# Patient Record
Sex: FEMALE | Race: OTHER | Employment: OTHER | URBAN - METROPOLITAN AREA
[De-identification: names, ages, dates, MRNs, and addresses within clinical notes are randomized per-mention and may not be internally consistent; named-entity substitution may affect disease eponyms.]

---

## 2017-02-07 ENCOUNTER — TELEPHONE (OUTPATIENT)
Dept: INTERNAL MEDICINE CLINIC | Facility: CLINIC | Age: 67
End: 2017-02-07

## 2017-02-07 NOTE — TELEPHONE ENCOUNTER
Per Dr. Enio Delgado instruction to call patient and ask patient to come in for f/u blood pressure prior to dental procedure.     LMTCB    RYANN,  When patient call back please assist to make a f/u appointment for BP

## 2017-02-13 ENCOUNTER — OFFICE VISIT (OUTPATIENT)
Dept: INTERNAL MEDICINE CLINIC | Facility: CLINIC | Age: 67
End: 2017-02-13

## 2017-02-13 VITALS
WEIGHT: 181 LBS | BODY MASS INDEX: 33 KG/M2 | SYSTOLIC BLOOD PRESSURE: 158 MMHG | RESPIRATION RATE: 22 BRPM | DIASTOLIC BLOOD PRESSURE: 96 MMHG | HEART RATE: 99 BPM

## 2017-02-13 DIAGNOSIS — I10 ESSENTIAL HYPERTENSION: Primary | ICD-10-CM

## 2017-02-13 PROCEDURE — 99214 OFFICE O/P EST MOD 30 MIN: CPT | Performed by: INTERNAL MEDICINE

## 2017-02-13 PROCEDURE — G0463 HOSPITAL OUTPT CLINIC VISIT: HCPCS | Performed by: INTERNAL MEDICINE

## 2017-02-13 RX ORDER — CARVEDILOL 3.12 MG/1
3.12 TABLET ORAL 2 TIMES DAILY WITH MEALS
Qty: 180 TABLET | Refills: 0 | Status: SHIPPED | OUTPATIENT
Start: 2017-02-13 | End: 2017-05-12

## 2017-02-14 NOTE — PROGRESS NOTES
HPI:    Patient ID: Rafiq Bhatia is a 73year old female presents for follow-up of hypertension.     HPI  Patient reports that she has been feeling well, she has seen a dentist requires a root canal, but blood pressure stays elevated to the dentist will Cardiovascular: Normal rate, regular rhythm and normal heart sounds. No murmur heard. Edema not present. Carotid bruit not present. Pulmonary/Chest: Effort normal and breath sounds normal. No respiratory distress. She has no wheezes.  She has no rale

## 2017-03-15 ENCOUNTER — OFFICE VISIT (OUTPATIENT)
Dept: INTERNAL MEDICINE CLINIC | Facility: CLINIC | Age: 67
End: 2017-03-15

## 2017-03-15 VITALS — SYSTOLIC BLOOD PRESSURE: 130 MMHG | DIASTOLIC BLOOD PRESSURE: 75 MMHG | RESPIRATION RATE: 23 BRPM | HEART RATE: 93 BPM

## 2017-03-15 DIAGNOSIS — I10 ESSENTIAL HYPERTENSION: Primary | ICD-10-CM

## 2017-03-15 DIAGNOSIS — E78.00 PURE HYPERCHOLESTEROLEMIA: ICD-10-CM

## 2017-03-15 PROCEDURE — G0463 HOSPITAL OUTPT CLINIC VISIT: HCPCS | Performed by: INTERNAL MEDICINE

## 2017-03-15 PROCEDURE — 99213 OFFICE O/P EST LOW 20 MIN: CPT | Performed by: INTERNAL MEDICINE

## 2017-03-15 RX ORDER — ERGOCALCIFEROL 1.25 MG/1
CAPSULE ORAL
Refills: 1 | COMMUNITY
Start: 2017-03-06 | End: 2017-06-07

## 2017-03-16 NOTE — PROGRESS NOTES
HPI:    Patient ID: Lazarus Sprinkle is a 73year old female presents for follow-up on hypertension.     HPI  Patient was seen in the office 1 month ago for evaluation of elevated blood pressure, she was started to carvedilol 3.125 mg twice a day and continu No murmur heard. Edema not present. Carotid bruit not present. Pulmonary/Chest: Effort normal and breath sounds normal. No respiratory distress. She has no wheezes. Lymphadenopathy:     She has no cervical adenopathy.    Neurological: She is alert and

## 2017-03-20 RX ORDER — DULOXETIN HYDROCHLORIDE 60 MG/1
CAPSULE, DELAYED RELEASE ORAL
Qty: 90 CAPSULE | Refills: 1 | Status: SHIPPED | OUTPATIENT
Start: 2017-03-20 | End: 2017-12-08

## 2017-05-04 RX ORDER — ENALAPRIL MALEATE 10 MG/1
TABLET ORAL
Qty: 180 TABLET | Refills: 3 | Status: SHIPPED | OUTPATIENT
Start: 2017-05-04 | End: 2018-05-03

## 2017-05-12 RX ORDER — CARVEDILOL 3.12 MG/1
TABLET ORAL
Qty: 180 TABLET | Refills: 1 | Status: SHIPPED | OUTPATIENT
Start: 2017-05-12 | End: 2017-12-16

## 2017-05-23 ENCOUNTER — OFFICE VISIT (OUTPATIENT)
Dept: INTERNAL MEDICINE CLINIC | Facility: CLINIC | Age: 67
End: 2017-05-23

## 2017-05-23 ENCOUNTER — APPOINTMENT (OUTPATIENT)
Dept: LAB | Age: 67
End: 2017-05-23
Attending: INTERNAL MEDICINE
Payer: MEDICARE

## 2017-05-23 ENCOUNTER — HOSPITAL ENCOUNTER (OUTPATIENT)
Dept: GENERAL RADIOLOGY | Age: 67
Discharge: HOME | End: 2017-05-23
Attending: INTERNAL MEDICINE | Admitting: INTERNAL MEDICINE
Payer: MEDICARE

## 2017-05-23 VITALS
DIASTOLIC BLOOD PRESSURE: 87 MMHG | WEIGHT: 181 LBS | BODY MASS INDEX: 33 KG/M2 | RESPIRATION RATE: 24 BRPM | SYSTOLIC BLOOD PRESSURE: 145 MMHG | HEART RATE: 98 BPM

## 2017-05-23 DIAGNOSIS — R05.9 COUGH: ICD-10-CM

## 2017-05-23 DIAGNOSIS — R05.9 COUGH: Primary | ICD-10-CM

## 2017-05-23 DIAGNOSIS — E55.9 VITAMIN D DEFICIENCY: ICD-10-CM

## 2017-05-23 DIAGNOSIS — J20.9 BRONCHITIS WITH BRONCHOSPASM: ICD-10-CM

## 2017-05-23 DIAGNOSIS — K21.9 GASTROESOPHAGEAL REFLUX DISEASE, ESOPHAGITIS PRESENCE NOT SPECIFIED: ICD-10-CM

## 2017-05-23 DIAGNOSIS — I10 ESSENTIAL HYPERTENSION: ICD-10-CM

## 2017-05-23 DIAGNOSIS — R07.9 CHEST PAIN AT REST: ICD-10-CM

## 2017-05-23 PROCEDURE — 99214 OFFICE O/P EST MOD 30 MIN: CPT | Performed by: INTERNAL MEDICINE

## 2017-05-23 PROCEDURE — 93005 ELECTROCARDIOGRAM TRACING: CPT

## 2017-05-23 PROCEDURE — G0463 HOSPITAL OUTPT CLINIC VISIT: HCPCS | Performed by: INTERNAL MEDICINE

## 2017-05-23 PROCEDURE — 93010 ELECTROCARDIOGRAM REPORT: CPT | Performed by: INTERNAL MEDICINE

## 2017-05-23 PROCEDURE — 94640 AIRWAY INHALATION TREATMENT: CPT | Performed by: INTERNAL MEDICINE

## 2017-05-23 PROCEDURE — 71020 XR CHEST PA + LAT CHEST (CPT=71020): CPT | Performed by: INTERNAL MEDICINE

## 2017-05-23 RX ORDER — CODEINE PHOSPHATE AND GUAIFENESIN 10; 100 MG/5ML; MG/5ML
5 SOLUTION ORAL EVERY 6 HOURS PRN
Qty: 180 ML | Refills: 0 | Status: SHIPPED | OUTPATIENT
Start: 2017-05-23 | End: 2017-08-08

## 2017-05-23 RX ORDER — AZITHROMYCIN 250 MG/1
TABLET, FILM COATED ORAL
Qty: 6 TABLET | Refills: 0 | Status: SHIPPED | OUTPATIENT
Start: 2017-05-23 | End: 2017-08-08

## 2017-05-23 RX ORDER — ALBUTEROL SULFATE 90 UG/1
2 AEROSOL, METERED RESPIRATORY (INHALATION) EVERY 4 HOURS PRN
Qty: 1 INHALER | Refills: 1 | Status: SHIPPED | OUTPATIENT
Start: 2017-05-23 | End: 2017-08-08

## 2017-05-23 RX ORDER — OMEPRAZOLE 40 MG/1
40 CAPSULE, DELAYED RELEASE ORAL DAILY
Qty: 90 CAPSULE | Refills: 0 | Status: SHIPPED | OUTPATIENT
Start: 2017-05-23 | End: 2017-08-28

## 2017-05-23 RX ORDER — LEVALBUTEROL INHALATION SOLUTION 0.63 MG/3ML
0.63 SOLUTION RESPIRATORY (INHALATION) ONCE
Status: COMPLETED | OUTPATIENT
Start: 2017-05-23 | End: 2017-05-23

## 2017-05-23 RX ADMIN — LEVALBUTEROL INHALATION SOLUTION 0.63 MG: 0.63 SOLUTION RESPIRATORY (INHALATION) at 15:04:00

## 2017-05-23 NOTE — PROGRESS NOTES
HPI:    Patient ID: Boogie Simpson is a 73year old female. Presents for evaluation of the cough.     HPI  Patient states that she is undergoing dental work, and recently in a dental office blood pressure was elevated again and the dentist canceled proced Sulfate HFA (VENTOLIN HFA) 108 (90 Base) MCG/ACT Inhalation Aero Soln Inhale 2 puffs into the lungs every 4 (four) hours as needed for Wheezing. Disp: 1 Inhaler Rfl: 1   CARVEDILOL 3.125 MG Oral Tab TAKE 1 TABLET BY MOUTH 2 TIMES DAILY WITH MEALS.  Disp: 18 will not change medication for now, will follow up in 2 weeks if blood pressure stays elevated we will adjust carvedilol  Vitamin d deficiency check vitamin D level, treat if necessary  GERD advised to eliminate peppermint, will treat with omeprazole 40 mg

## 2017-05-25 ENCOUNTER — TELEPHONE (OUTPATIENT)
Dept: INTERNAL MEDICINE CLINIC | Facility: CLINIC | Age: 67
End: 2017-05-25

## 2017-05-26 NOTE — TELEPHONE ENCOUNTER
Advised patient of Dr. Tom Pantoja notes. Patient verbalized understanding.     Notes Recorded by Vj Pelaez MD on 5/25/2017 at 8:09 PM  See below, see also chest x-ray results  Notes Recorded by Vj Pelaez MD on 5/25/2017 at 8:09 PM  Call patient EKG

## 2017-06-06 ENCOUNTER — HOSPITAL ENCOUNTER (OUTPATIENT)
Dept: CV DIAGNOSTICS | Facility: HOSPITAL | Age: 67
Discharge: HOME | End: 2017-06-06
Attending: INTERNAL MEDICINE
Payer: MEDICARE

## 2017-06-06 ENCOUNTER — HOSPITAL ENCOUNTER (OUTPATIENT)
Dept: NUCLEAR MEDICINE | Facility: HOSPITAL | Age: 67
Discharge: HOME | End: 2017-06-06
Attending: INTERNAL MEDICINE
Payer: MEDICARE

## 2017-06-06 DIAGNOSIS — R07.9 CHEST PAIN AT REST: ICD-10-CM

## 2017-06-06 PROCEDURE — 93016 CV STRESS TEST SUPVJ ONLY: CPT | Performed by: INTERNAL MEDICINE

## 2017-06-06 PROCEDURE — 93018 CV STRESS TEST I&R ONLY: CPT | Performed by: INTERNAL MEDICINE

## 2017-06-06 RX ORDER — 0.9 % SODIUM CHLORIDE 0.9 %
VIAL (ML) INJECTION
Status: COMPLETED
Start: 2017-06-06 | End: 2017-06-06

## 2017-06-06 RX ADMIN — 0.9 % SODIUM CHLORIDE 10 ML: 0.9 % VIAL (ML) INJECTION at 11:45:00

## 2017-06-07 ENCOUNTER — TELEPHONE (OUTPATIENT)
Dept: INTERNAL MEDICINE CLINIC | Facility: CLINIC | Age: 67
End: 2017-06-07

## 2017-06-07 RX ORDER — ERGOCALCIFEROL 1.25 MG/1
50000 CAPSULE ORAL WEEKLY
Qty: 12 CAPSULE | Refills: 1 | Status: SHIPPED | OUTPATIENT
Start: 2017-06-07 | End: 2017-11-24

## 2017-06-08 ENCOUNTER — TELEPHONE (OUTPATIENT)
Dept: INTERNAL MEDICINE CLINIC | Facility: CLINIC | Age: 67
End: 2017-06-08

## 2017-06-12 ENCOUNTER — TELEPHONE (OUTPATIENT)
Dept: INTERNAL MEDICINE CLINIC | Facility: CLINIC | Age: 67
End: 2017-06-12

## 2017-06-12 NOTE — TELEPHONE ENCOUNTER
----- Message from Lavinia Sepulveda MD sent at 6/7/2017  7:45 PM CDT -----  Normal normal call patient that vitamin D level improved, still needs to continue prescription vitamin D for at least another 6 months, I will send prescription to the pharmacy, mely

## 2017-06-13 NOTE — TELEPHONE ENCOUNTER
Pt notified of results and recommendations. Verbalizes understanding and denies questions at this time. States she is willing to take cholesterol reducing medications. NK please advise on rx. Stress test results also relayed to pt.

## 2017-06-14 ENCOUNTER — TELEPHONE (OUTPATIENT)
Dept: INTERNAL MEDICINE CLINIC | Facility: CLINIC | Age: 67
End: 2017-06-14

## 2017-06-14 DIAGNOSIS — E78.5 HYPERLIPIDEMIA, UNSPECIFIED HYPERLIPIDEMIA TYPE: Primary | ICD-10-CM

## 2017-06-14 DIAGNOSIS — I10 ESSENTIAL HYPERTENSION: ICD-10-CM

## 2017-06-14 RX ORDER — ATORVASTATIN CALCIUM 10 MG/1
10 TABLET, FILM COATED ORAL NIGHTLY
Qty: 90 TABLET | Refills: 0 | Status: SHIPPED | OUTPATIENT
Start: 2017-06-14 | End: 2017-09-16

## 2017-07-21 ENCOUNTER — TELEPHONE (OUTPATIENT)
Dept: INTERNAL MEDICINE CLINIC | Facility: CLINIC | Age: 67
End: 2017-07-21

## 2017-07-21 RX ORDER — NITROFURANTOIN 25; 75 MG/1; MG/1
100 CAPSULE ORAL 2 TIMES DAILY
Qty: 14 CAPSULE | Refills: 0 | Status: SHIPPED | OUTPATIENT
Start: 2017-07-21 | End: 2017-08-08

## 2017-07-21 NOTE — TELEPHONE ENCOUNTER
Actions Requested:    No appointment times available. Please advise.    Patient would like an antibiotic to her pharmacy for has a history of UTI  Problem:    UTI  Onset and Timin days ago  Associated Symptoms:   Patient stated has urinary urgency,

## 2017-08-08 ENCOUNTER — OFFICE VISIT (OUTPATIENT)
Dept: INTERNAL MEDICINE CLINIC | Facility: CLINIC | Age: 67
End: 2017-08-08

## 2017-08-08 VITALS
BODY MASS INDEX: 34 KG/M2 | WEIGHT: 185 LBS | HEART RATE: 80 BPM | TEMPERATURE: 98 F | SYSTOLIC BLOOD PRESSURE: 125 MMHG | DIASTOLIC BLOOD PRESSURE: 82 MMHG

## 2017-08-08 DIAGNOSIS — F32.5 MAJOR DEPRESSIVE DISORDER WITH SINGLE EPISODE, IN FULL REMISSION (HCC): ICD-10-CM

## 2017-08-08 DIAGNOSIS — I10 ESSENTIAL HYPERTENSION: ICD-10-CM

## 2017-08-08 DIAGNOSIS — R68.89 ABNORMAL FINDING: ICD-10-CM

## 2017-08-08 DIAGNOSIS — N39.0 URINARY TRACT INFECTION WITHOUT HEMATURIA, SITE UNSPECIFIED: Primary | ICD-10-CM

## 2017-08-08 DIAGNOSIS — M17.10 ARTHRITIS OF KNEE: ICD-10-CM

## 2017-08-08 DIAGNOSIS — R10.2 VAGINAL PAIN: ICD-10-CM

## 2017-08-08 DIAGNOSIS — R10.31 RIGHT LOWER QUADRANT ABDOMINAL PAIN: ICD-10-CM

## 2017-08-08 LAB
APPEARANCE: CLEAR
BILIRUB UR QL: NEGATIVE
BILIRUBIN: NEGATIVE
COLOR UR: YELLOW
GLUCOSE (URINE DIPSTICK): NEGATIVE MG/DL
GLUCOSE UR-MCNC: NEGATIVE MG/DL
KETONES (URINE DIPSTICK): NEGATIVE MG/DL
KETONES UR-MCNC: NEGATIVE MG/DL
LEUKOCYTES: POSITIVE
MULTISTIX LOT#: NORMAL NUMERIC
NITRITE UR QL STRIP.AUTO: NEGATIVE
NITRITE, URINE: NEGATIVE
OCCULT BLOOD: POSITIVE
PH UR: 5 [PH] (ref 5–8)
PH, URINE: 4.5 (ref 4.5–8)
PROT UR-MCNC: NEGATIVE MG/DL
PROTEIN (URINE DIPSTICK): NEGATIVE MG/DL
RBC #/AREA URNS AUTO: 3 /HPF
SP GR UR STRIP: 1.02 (ref 1–1.03)
SPECIFIC GRAVITY: 1 (ref 1–1.03)
URINE-COLOR: YELLOW
UROBILINOGEN UR STRIP-ACNC: <2
UROBILINOGEN,SEMI-QN: 0 MG/DL (ref 0–1.9)
VIT C UR-MCNC: NEGATIVE MG/DL
WBC #/AREA URNS AUTO: 6 /HPF

## 2017-08-08 PROCEDURE — G0463 HOSPITAL OUTPT CLINIC VISIT: HCPCS | Performed by: INTERNAL MEDICINE

## 2017-08-08 PROCEDURE — 81002 URINALYSIS NONAUTO W/O SCOPE: CPT | Performed by: INTERNAL MEDICINE

## 2017-08-08 PROCEDURE — 99214 OFFICE O/P EST MOD 30 MIN: CPT | Performed by: INTERNAL MEDICINE

## 2017-08-08 RX ORDER — LORAZEPAM 0.5 MG/1
0.5 TABLET ORAL NIGHTLY
Qty: 90 TABLET | Refills: 0 | Status: SHIPPED | OUTPATIENT
Start: 2017-08-08 | End: 2018-08-08

## 2017-08-13 PROBLEM — F32.4 MAJOR DEPRESSION IN PARTIAL REMISSION (HCC): Chronic | Status: ACTIVE | Noted: 2017-08-13

## 2017-08-15 ENCOUNTER — HOSPITAL ENCOUNTER (OUTPATIENT)
Dept: CT IMAGING | Age: 67
Discharge: HOME | End: 2017-08-15
Attending: INTERNAL MEDICINE
Payer: MEDICARE

## 2017-08-15 DIAGNOSIS — R10.31 RIGHT LOWER QUADRANT ABDOMINAL PAIN: ICD-10-CM

## 2017-08-15 LAB — CREAT BLD-MCNC: 0.7 MG/DL (ref 0.5–1.5)

## 2017-08-15 PROCEDURE — 82565 ASSAY OF CREATININE: CPT

## 2017-08-15 PROCEDURE — 74177 CT ABD & PELVIS W/CONTRAST: CPT | Performed by: INTERNAL MEDICINE

## 2017-08-17 ENCOUNTER — TELEPHONE (OUTPATIENT)
Dept: INTERNAL MEDICINE CLINIC | Facility: CLINIC | Age: 67
End: 2017-08-17

## 2017-08-18 ENCOUNTER — TELEPHONE (OUTPATIENT)
Dept: INTERNAL MEDICINE CLINIC | Facility: CLINIC | Age: 67
End: 2017-08-18

## 2017-08-18 DIAGNOSIS — R93.1 ABNORMAL CT SCAN OF HEART: ICD-10-CM

## 2017-08-18 DIAGNOSIS — R06.00 DYSPNEA, UNSPECIFIED TYPE: Primary | ICD-10-CM

## 2017-08-19 NOTE — TELEPHONE ENCOUNTER
Notes Recorded by Shelli Chappell MD on 8/18/2017 at 5:45 PM CDT  Spoke to patient about below  ------    Notes Recorded by Shelli Chappell MD on 8/18/2017 at 1:02 PM CDT  Spoke to patient, reported no acute findings on CT scan, no abnormalities found in the

## 2017-08-21 ENCOUNTER — TELEPHONE (OUTPATIENT)
Dept: INTERNAL MEDICINE CLINIC | Facility: CLINIC | Age: 67
End: 2017-08-21

## 2017-08-21 DIAGNOSIS — N30.90 BLADDER INFECTION: Primary | ICD-10-CM

## 2017-08-22 ENCOUNTER — TELEPHONE (OUTPATIENT)
Dept: INTERNAL MEDICINE CLINIC | Facility: CLINIC | Age: 67
End: 2017-08-22

## 2017-08-22 DIAGNOSIS — R39.89 URINARY PROBLEM: Primary | ICD-10-CM

## 2017-08-22 NOTE — TELEPHONE ENCOUNTER
Referral pend for approval for urology consult. No record in [facility] where pt has been seen previously.

## 2017-08-23 ENCOUNTER — HOSPITAL ENCOUNTER (OUTPATIENT)
Dept: CV DIAGNOSTICS | Facility: HOSPITAL | Age: 67
Discharge: HOME | End: 2017-08-23
Attending: INTERNAL MEDICINE
Payer: MEDICARE

## 2017-08-23 DIAGNOSIS — R06.00 DYSPNEA, UNSPECIFIED TYPE: ICD-10-CM

## 2017-08-23 DIAGNOSIS — R93.1 ABNORMAL CT SCAN OF HEART: ICD-10-CM

## 2017-08-23 PROCEDURE — 93306 TTE W/DOPPLER COMPLETE: CPT | Performed by: INTERNAL MEDICINE

## 2017-08-24 ENCOUNTER — TELEPHONE (OUTPATIENT)
Dept: INTERNAL MEDICINE CLINIC | Facility: CLINIC | Age: 67
End: 2017-08-24

## 2017-08-26 ENCOUNTER — TELEPHONE (OUTPATIENT)
Dept: OTHER | Age: 67
End: 2017-08-26

## 2017-08-26 DIAGNOSIS — R06.02 SHORTNESS OF BREATH: Primary | ICD-10-CM

## 2017-08-26 NOTE — TELEPHONE ENCOUNTER
Notes Recorded by Delvin Sotelo MD on 8/24/2017 at 6:25 PM CDT  Left message for the patient, if she calls please let you know that echocardiogram showed strong heart muscle, mild aortic regurgitation and mild mitral valve regurgitation it means that 2 wa

## 2017-08-28 NOTE — TELEPHONE ENCOUNTER
Spoke with patient (identified name and ), results reviewed and agrees with plan. She will agree to the pulmonary function test.   Phone number given to schedule the test.      Dr. Manuela Ghosh   Please order and close the encounter.    No need to call the p

## 2017-08-29 RX ORDER — OMEPRAZOLE 40 MG/1
40 CAPSULE, DELAYED RELEASE ORAL DAILY
Qty: 90 CAPSULE | Refills: 0 | Status: SHIPPED | OUTPATIENT
Start: 2017-08-29 | End: 2019-11-08

## 2017-08-29 NOTE — TELEPHONE ENCOUNTER
Refilled per protocol.    Refill Protocol Appointment Criteria  · Appointment scheduled in the past 12 months or in the next 3 months  Recent Outpatient Visits            3 weeks ago Urinary tract infection without hematuria, site unspecified    [facility]

## 2017-08-29 NOTE — TELEPHONE ENCOUNTER
The PFT needs to be ordered   Which test do you want and a diagnosis is needed. I pended both PFT tests. Please pick one.

## 2017-08-31 NOTE — TELEPHONE ENCOUNTER
Order placed. [8/31/2017 8:46 AM] Forrest Ferrera @[place]:   Hi   which PFT do you want for Giuseppe Wagoner [birth year]  with a bronchodilator or no bronchodilator.   [8/31/2017 8:47 AM] Lola Villanueva:   with  bronchodilator

## 2017-09-12 ENCOUNTER — OFFICE VISIT (OUTPATIENT)
Dept: SURGERY | Facility: CLINIC | Age: 67
End: 2017-09-12

## 2017-09-12 VITALS
WEIGHT: 180 LBS | BODY MASS INDEX: 30.73 KG/M2 | DIASTOLIC BLOOD PRESSURE: 97 MMHG | SYSTOLIC BLOOD PRESSURE: 146 MMHG | HEART RATE: 81 BPM | HEIGHT: 64 IN | TEMPERATURE: 98 F

## 2017-09-12 DIAGNOSIS — N39.0 RECURRENT UTI: Primary | ICD-10-CM

## 2017-09-12 DIAGNOSIS — R82.90 URINE FINDING: ICD-10-CM

## 2017-09-12 LAB
APPEARANCE: CLEAR
MULTISTIX LOT#: ABNORMAL NUMERIC
PH, URINE: 6 (ref 4.5–8)
SPECIFIC GRAVITY: 1.02 (ref 1–1.03)
URINE-COLOR: YELLOW
UROBILINOGEN,SEMI-QN: 4 MG/DL (ref 0–1.9)

## 2017-09-12 PROCEDURE — G0463 HOSPITAL OUTPT CLINIC VISIT: HCPCS | Performed by: UROLOGY

## 2017-09-12 PROCEDURE — 99204 OFFICE O/P NEW MOD 45 MIN: CPT | Performed by: UROLOGY

## 2017-09-12 PROCEDURE — 81003 URINALYSIS AUTO W/O SCOPE: CPT | Performed by: UROLOGY

## 2017-09-12 NOTE — PROGRESS NOTES
SUBJECTIVE:  Boogie Simpson is a 78year old female who presents for a consultation at the request of, and a copy of this note will be sent to, Dr Misael Jacobs, for evaluation of  recurrent urinary tract infections and bladder pain.   She states she had a hyster claudication. GASTROINTESTINAL:  Negative for nausea, vomiting, diarrhea, constipation, heartburn or indigestion, abdominal pains, bloody or tarry stools. GENERAL: Denies:  weight gain, weight loss, fever, night sweats, bone pain, malaise and fatigue.  Po

## 2017-09-14 ENCOUNTER — TELEPHONE (OUTPATIENT)
Dept: SURGERY | Facility: CLINIC | Age: 67
End: 2017-09-14

## 2017-09-14 NOTE — TELEPHONE ENCOUNTER
Patient has cystoscopy scheduled for 11/07/17 states she has titanium screws and other hardware down her leg. States she believes she will need antibiotics for this procedure. Please call. Thank you.

## 2017-09-16 ENCOUNTER — TELEPHONE (OUTPATIENT)
Dept: INTERNAL MEDICINE CLINIC | Facility: CLINIC | Age: 67
End: 2017-09-16

## 2017-09-16 DIAGNOSIS — E78.00 PURE HYPERCHOLESTEROLEMIA: Primary | ICD-10-CM

## 2017-09-16 RX ORDER — ATORVASTATIN CALCIUM 10 MG/1
TABLET, FILM COATED ORAL
Qty: 90 TABLET | Refills: 0 | Status: SHIPPED | OUTPATIENT
Start: 2017-09-16 | End: 2019-03-22

## 2017-09-16 NOTE — TELEPHONE ENCOUNTER
PLEASE ASK PT TO DO 3 MONTHS F-UP LABS ON   CHOLESTEROL I REFILLED Atorvastatin. , order is in computer

## 2017-09-18 ENCOUNTER — TELEPHONE (OUTPATIENT)
Dept: INTERNAL MEDICINE CLINIC | Facility: CLINIC | Age: 67
End: 2017-09-18

## 2017-09-18 NOTE — TELEPHONE ENCOUNTER
Spoke with patient. Pt. States she had a shingles in 2011, wondering if still need a shingle vaccine. Please advise.

## 2017-09-19 RX ORDER — CIPROFLOXACIN 500 MG/1
TABLET, FILM COATED ORAL
Qty: 1 TABLET | Refills: 0 | Status: CANCELLED | OUTPATIENT
Start: 2017-09-19

## 2017-09-19 NOTE — TELEPHONE ENCOUNTER
Spoke with Dr. Tiago Bush advised patient called regarding screws in knee scheduled for cysto on 11/7 does she need antibiotics. Per Dr. Tiago Bush order given for ciprofloxin 500mg 1 tab p.o. 12 hours before procedure. Left message for patient to call back.  Medic

## 2017-10-05 ENCOUNTER — TELEPHONE (OUTPATIENT)
Dept: INTERNAL MEDICINE CLINIC | Facility: CLINIC | Age: 67
End: 2017-10-05

## 2017-10-12 PROBLEM — E66.9 OBESITY (BMI 30.0-34.9): Status: ACTIVE | Noted: 2017-10-12

## 2017-10-12 PROBLEM — I70.0 ATHEROSCLEROSIS OF AORTA (HCC): Status: ACTIVE | Noted: 2017-10-12

## 2017-10-12 PROBLEM — I77.1 TORTUOUS AORTA (HCC): Status: ACTIVE | Noted: 2017-10-12

## 2017-10-27 ENCOUNTER — OFFICE VISIT (OUTPATIENT)
Dept: INTERNAL MEDICINE CLINIC | Facility: CLINIC | Age: 67
End: 2017-10-27

## 2017-10-27 VITALS
HEIGHT: 63 IN | RESPIRATION RATE: 22 BRPM | WEIGHT: 189 LBS | BODY MASS INDEX: 33.49 KG/M2 | SYSTOLIC BLOOD PRESSURE: 133 MMHG | HEART RATE: 88 BPM | DIASTOLIC BLOOD PRESSURE: 87 MMHG | TEMPERATURE: 98 F

## 2017-10-27 DIAGNOSIS — I10 ESSENTIAL HYPERTENSION: ICD-10-CM

## 2017-10-27 DIAGNOSIS — M17.12 PRIMARY OSTEOARTHRITIS OF LEFT KNEE: ICD-10-CM

## 2017-10-27 DIAGNOSIS — I70.0 ATHEROSCLEROSIS OF AORTA (HCC): ICD-10-CM

## 2017-10-27 DIAGNOSIS — Z12.31 ENCOUNTER FOR SCREENING MAMMOGRAM FOR MALIGNANT NEOPLASM OF BREAST: ICD-10-CM

## 2017-10-27 DIAGNOSIS — E78.00 PURE HYPERCHOLESTEROLEMIA: ICD-10-CM

## 2017-10-27 DIAGNOSIS — Z00.00 PHYSICAL EXAM, ANNUAL: Primary | ICD-10-CM

## 2017-10-27 DIAGNOSIS — F41.9 ANXIETY: ICD-10-CM

## 2017-10-27 DIAGNOSIS — E55.9 VITAMIN D DEFICIENCY: ICD-10-CM

## 2017-10-27 DIAGNOSIS — D50.9 IRON DEFICIENCY ANEMIA, UNSPECIFIED IRON DEFICIENCY ANEMIA TYPE: ICD-10-CM

## 2017-10-27 DIAGNOSIS — F32.5 MAJOR DEPRESSIVE DISORDER WITH SINGLE EPISODE, IN FULL REMISSION (HCC): ICD-10-CM

## 2017-10-27 PROBLEM — R05.9 COUGH: Status: RESOLVED | Noted: 2017-05-23 | Resolved: 2017-10-27

## 2017-10-27 PROBLEM — J20.9 BRONCHITIS WITH BRONCHOSPASM: Status: RESOLVED | Noted: 2017-05-23 | Resolved: 2017-10-27

## 2017-10-27 PROBLEM — R07.9 CHEST PAIN AT REST: Status: RESOLVED | Noted: 2017-05-23 | Resolved: 2017-10-27

## 2017-10-27 PROCEDURE — 96160 PT-FOCUSED HLTH RISK ASSMT: CPT | Performed by: INTERNAL MEDICINE

## 2017-11-03 NOTE — PROGRESS NOTES
HPI:   Aneudy Wick is a 78year old female who presents for a MA (Medicare Advantage) Supervisit (Once per calendar year). Patient reports that she has been feeling fair, she will be having urological workup soon for evaluation of microhematuria.   Abdias Cruz WITH MEALS. ENALAPRIL MALEATE 10 MG Oral Tab TAKE ONE TABLET BY MOUTH TWICE DAILY   DULOXETINE HCL 60 MG Oral Cap DR Particles TAKE 1 CAPSULE (60 MG TOTAL) BY MOUTH DAILY.      Current Facility-Administered Medications for the 10/27/17 encounter (Office V Questionnaire  I have a problem hearing over the telephone:  No I have trouble following the conversations when two or more people are talking at the same time:  No   I have trouble understanding things on the TV:  No I have to strain to understand convers carotid bruit or JVD   Back:   Symmetric, no curvature, ROM normal, no CVA tenderness   Lungs:   Clear to auscultation bilaterally, respirations unlabored   Heart:  Regular rate and rhythm, S1 and S2 normal, no murmur, rub, or gallop   Abdomen:   Soft, non orthopedic guidance    Major depressive disorder with single episode, in full remission ([facility] [address] controlled, continue duloxetine 60 mg daily, follow-up in 6 months    Atherosclerosis of aorta (HCC) stable, treat risk factors       Diet assessment: good     Ad Problems?: No      Fall/Risk Assessment          Have you fallen in the last 12 months?: 0-No                                        Fall/Risk Scorin          Depression Screening (PHQ-2/PHQ-9): Over the LAST 2 WEEKS   Little interest or pleasure in do flowsheet data found. Glaucoma Screening      Ophthalmology Visit Annually: Diabetics, FHx Glaucoma, AA>50, > 65 No flowsheet data found. Bone Density Screening      Dexascan Every two years No results found for this or any previous visit.  No diuretics, anticonvulsants.)    Potassium  Annually Potassium (mmol/L)   Date Value   06/06/2017 4.1     POTASSIUM (P) (mmol/L)   Date Value   02/16/2016 4.0    No flowsheet data found.     Creatinine  Annually Creatinine (mg/dL)   Date Value   06/06/2017 0

## 2017-11-07 ENCOUNTER — TELEPHONE (OUTPATIENT)
Dept: SURGERY | Facility: CLINIC | Age: 67
End: 2017-11-07

## 2017-11-07 ENCOUNTER — OFFICE VISIT (OUTPATIENT)
Dept: SURGERY | Facility: CLINIC | Age: 67
End: 2017-11-07

## 2017-11-07 VITALS — TEMPERATURE: 98 F | HEART RATE: 72 BPM | DIASTOLIC BLOOD PRESSURE: 90 MMHG | SYSTOLIC BLOOD PRESSURE: 140 MMHG

## 2017-11-07 DIAGNOSIS — R31.29 MICROHEMATURIA: Primary | ICD-10-CM

## 2017-11-07 PROCEDURE — 99213 OFFICE O/P EST LOW 20 MIN: CPT | Performed by: UROLOGY

## 2017-11-07 PROCEDURE — 52000 CYSTOURETHROSCOPY: CPT | Performed by: UROLOGY

## 2017-11-07 NOTE — PROGRESS NOTES
Shantell Casey is a 78year old female. HPI:   Patient presents with: Follow - Up: Cystoscopy with WENDY       78year-old presents for office cystoscopy. In consultation September 12, 2017.   She presented with recurrent episodes of urinary tract infec DAILY Disp: 180 tablet Rfl: 3   DULOXETINE HCL 60 MG Oral Cap DR Particles TAKE 1 CAPSULE (60 MG TOTAL) BY MOUTH DAILY.  Disp: 90 capsule Rfl: 1       Allergies:  No Known Allergies      ROS:       PHYSICAL EXAM:   Calixto Chowdary  : 1950  Referring

## 2017-11-13 ENCOUNTER — TELEPHONE (OUTPATIENT)
Dept: SURGERY | Facility: CLINIC | Age: 67
End: 2017-11-13

## 2017-11-15 NOTE — TELEPHONE ENCOUNTER
Phoned pt and spoke with her.  Informed her Augie Porter has not yet had opportunity to review and advise on results, however provided her with the results as outlined below, with the understanding that Augei Porter will need to review and advise on them, and sta

## 2017-11-20 NOTE — TELEPHONE ENCOUNTER
Phoned pt and received her voice mail. Lmtcb, stating letter was mailed with normal result and to call back with any questions. Clinic call back number provided.           Result   CYTOLOGY FLUIDS (Order [id number])   Result Notes     Notes Recorded by Johana

## 2017-11-24 RX ORDER — ERGOCALCIFEROL 1.25 MG/1
50000 CAPSULE ORAL WEEKLY
Qty: 12 CAPSULE | Refills: 0 | Status: SHIPPED | OUTPATIENT
Start: 2017-11-24 | End: 2019-11-18

## 2017-12-06 NOTE — PROGRESS NOTES
HPI:    Patient ID: Luis Phan is a 78year old female presents for follow-up on UTI symptoms, hypertension, knee pain    HPI  Patient reports that she has been bothered by frequency of urination right lower quadrant abdominal pain for last few days. Negative for chest pain and irregular heartbeat/palpitations  Respiratory:  Negative for cough, dyspnea and wheezing.   Eyes:  Negative for eye discharge and vision loss  Endocrine:  Negative for polydipsia and polyphagia  Integumentary:  Negative for pruri Comments: Uterus and ovaries are absent, tender on palpation on internal exam right lower quadrant   Musculoskeletal:   Patient walks with a severe limp, left knee with mild mild edema, no redness, full range of motion in the left knee   Neurological: She Pt encountered supine in bed, + IV lock, L sided weakness, minamally verval,

## 2017-12-08 RX ORDER — DULOXETIN HYDROCHLORIDE 60 MG/1
CAPSULE, DELAYED RELEASE ORAL
Qty: 90 CAPSULE | Refills: 1 | Status: SHIPPED | OUTPATIENT
Start: 2017-12-08 | End: 2018-06-24

## 2017-12-12 RX ORDER — ATORVASTATIN CALCIUM 10 MG/1
TABLET, FILM COATED ORAL
Qty: 90 TABLET | Refills: 0 | OUTPATIENT
Start: 2017-12-12

## 2017-12-12 NOTE — TELEPHONE ENCOUNTER
Spoke with pt. Informed per nk`s notes. Pt. Verbalized understanding. Pt. States she is doing blood test this Saturday. She \would like to have refills after test for 1 year supplies if possible.

## 2017-12-12 NOTE — TELEPHONE ENCOUNTER
Please call patient she is overdue to have her lab test done, we started him cholesterol medicine and she never had testing done I need to make sure that medicine does not cause side effects, orders and computer fasting lab work, can refill atorvastatin 30 tablets if needed until she gets blood work done

## 2017-12-16 ENCOUNTER — TELEPHONE (OUTPATIENT)
Dept: INTERNAL MEDICINE CLINIC | Facility: CLINIC | Age: 67
End: 2017-12-16

## 2017-12-16 DIAGNOSIS — D50.9 IRON DEFICIENCY ANEMIA, UNSPECIFIED IRON DEFICIENCY ANEMIA TYPE: Primary | ICD-10-CM

## 2017-12-16 RX ORDER — CARVEDILOL 3.12 MG/1
TABLET ORAL
Qty: 180 TABLET | Refills: 1 | Status: SHIPPED | OUTPATIENT
Start: 2017-12-16 | End: 2018-06-26

## 2018-01-06 ENCOUNTER — APPOINTMENT (OUTPATIENT)
Dept: LAB | Age: 68
End: 2018-01-06
Attending: INTERNAL MEDICINE
Payer: MEDICARE

## 2018-01-06 ENCOUNTER — HOSPITAL ENCOUNTER (OUTPATIENT)
Dept: MAMMOGRAPHY | Age: 68
Discharge: HOME | End: 2018-01-06
Attending: INTERNAL MEDICINE
Payer: MEDICARE

## 2018-01-06 DIAGNOSIS — I10 ESSENTIAL HYPERTENSION: ICD-10-CM

## 2018-01-06 DIAGNOSIS — Z12.31 ENCOUNTER FOR SCREENING MAMMOGRAM FOR MALIGNANT NEOPLASM OF BREAST: ICD-10-CM

## 2018-01-06 DIAGNOSIS — E78.5 HYPERLIPIDEMIA, UNSPECIFIED HYPERLIPIDEMIA TYPE: ICD-10-CM

## 2018-01-06 DIAGNOSIS — E78.00 PURE HYPERCHOLESTEROLEMIA: ICD-10-CM

## 2018-01-06 DIAGNOSIS — D50.9 IRON DEFICIENCY ANEMIA, UNSPECIFIED IRON DEFICIENCY ANEMIA TYPE: ICD-10-CM

## 2018-01-06 LAB
ALBUMIN SERPL BCP-MCNC: 3.6 G/DL (ref 3.5–4.8)
ALBUMIN/GLOB SERPL: 1.2 {RATIO} (ref 1–2)
ALP SERPL-CCNC: 105 U/L (ref 32–100)
ALT SERPL-CCNC: 16 U/L (ref 14–54)
ANION GAP SERPL CALC-SCNC: 6 MMOL/L (ref 0–18)
AST SERPL-CCNC: 14 U/L (ref 15–41)
BASOPHILS # BLD: 0 K/UL (ref 0–0.2)
BASOPHILS NFR BLD: 1 %
BILIRUB SERPL-MCNC: 0.7 MG/DL (ref 0.3–1.2)
BUN SERPL-MCNC: 19 MG/DL (ref 8–20)
BUN/CREAT SERPL: 26.8 (ref 10–20)
CALCIUM SERPL-MCNC: 9.1 MG/DL (ref 8.5–10.5)
CHLORIDE SERPL-SCNC: 106 MMOL/L (ref 95–110)
CHOLEST SERPL-MCNC: 164 MG/DL (ref 110–200)
CK SERPL-CCNC: 30 U/L (ref 38–234)
CO2 SERPL-SCNC: 26 MMOL/L (ref 22–32)
CREAT SERPL-MCNC: 0.71 MG/DL (ref 0.5–1.5)
EOSINOPHIL # BLD: 0.2 K/UL (ref 0–0.7)
EOSINOPHIL NFR BLD: 2 %
ERYTHROCYTE [DISTWIDTH] IN BLOOD BY AUTOMATED COUNT: 13.9 % (ref 11–15)
GLOBULIN PLAS-MCNC: 2.9 G/DL (ref 2.5–3.7)
GLUCOSE SERPL-MCNC: 88 MG/DL (ref 70–99)
HCT VFR BLD AUTO: 40.1 % (ref 35–48)
HDLC SERPL-MCNC: 59 MG/DL
HGB BLD-MCNC: 13 G/DL (ref 12–16)
LDLC SERPL CALC-MCNC: 91 MG/DL (ref 0–99)
LYMPHOCYTES # BLD: 1.4 K/UL (ref 1–4)
LYMPHOCYTES NFR BLD: 19 %
MCH RBC QN AUTO: 25.7 PG (ref 27–32)
MCHC RBC AUTO-ENTMCNC: 32.4 G/DL (ref 32–37)
MCV RBC AUTO: 79.1 FL (ref 80–100)
MONOCYTES # BLD: 0.6 K/UL (ref 0–1)
MONOCYTES NFR BLD: 8 %
NEUTROPHILS # BLD AUTO: 4.9 K/UL (ref 1.8–7.7)
NEUTROPHILS NFR BLD: 70 %
NONHDLC SERPL-MCNC: 105 MG/DL
OSMOLALITY UR CALC.SUM OF ELEC: 288 MOSM/KG (ref 275–295)
PLATELET # BLD AUTO: 240 K/UL (ref 140–400)
PMV BLD AUTO: 7.8 FL (ref 7.4–10.3)
POTASSIUM SERPL-SCNC: 3.4 MMOL/L (ref 3.3–5.1)
PROT SERPL-MCNC: 6.5 G/DL (ref 5.9–8.4)
RBC # BLD AUTO: 5.07 M/UL (ref 3.7–5.4)
SODIUM SERPL-SCNC: 138 MMOL/L (ref 136–144)
TRIGL SERPL-MCNC: 68 MG/DL (ref 1–149)
WBC # BLD AUTO: 7 K/UL (ref 4–11)

## 2018-01-06 PROCEDURE — 82728 ASSAY OF FERRITIN: CPT | Performed by: INTERNAL MEDICINE

## 2018-01-06 PROCEDURE — 82550 ASSAY OF CK (CPK): CPT

## 2018-01-06 PROCEDURE — 84466 ASSAY OF TRANSFERRIN: CPT

## 2018-01-06 PROCEDURE — 77067 SCR MAMMO BI INCL CAD: CPT | Performed by: INTERNAL MEDICINE

## 2018-01-06 PROCEDURE — 82306 VITAMIN D 25 HYDROXY: CPT | Performed by: INTERNAL MEDICINE

## 2018-01-06 PROCEDURE — 83540 ASSAY OF IRON: CPT

## 2018-01-06 PROCEDURE — 85025 COMPLETE CBC W/AUTO DIFF WBC: CPT | Performed by: INTERNAL MEDICINE

## 2018-01-06 PROCEDURE — 80061 LIPID PANEL: CPT

## 2018-01-06 PROCEDURE — 36415 COLL VENOUS BLD VENIPUNCTURE: CPT | Performed by: INTERNAL MEDICINE

## 2018-01-06 PROCEDURE — 80053 COMPREHEN METABOLIC PANEL: CPT

## 2018-01-08 LAB
FERRITIN SERPL IA-MCNC: 115 NG/ML (ref 11–307)
IRON SATN MFR SERPL: 23 % (ref 15–50)
IRON SERPL-MCNC: 74 MCG/DL (ref 28–170)
TIBC SERPL-MCNC: 321 MCG/DL (ref 228–428)
TRANSFERRIN SERPL-MCNC: 243 MG/DL (ref 192–382)

## 2018-01-08 NOTE — TELEPHONE ENCOUNTER
Please call lab, asked to run ferritin level, iron and TIBC level if possible from the blood they have

## 2018-01-10 LAB — 25(OH)D3 SERPL-MCNC: 22.5 NG/ML

## 2018-01-11 ENCOUNTER — TELEPHONE (OUTPATIENT)
Dept: INTERNAL MEDICINE CLINIC | Facility: CLINIC | Age: 68
End: 2018-01-11

## 2018-01-12 NOTE — TELEPHONE ENCOUNTER
Left message to call back, if patient calls let you speak to the nurse about blood test results and mammogram

## 2018-01-19 ENCOUNTER — HOSPITAL ENCOUNTER (OUTPATIENT)
Dept: ULTRASOUND IMAGING | Facility: HOSPITAL | Age: 68
Discharge: HOME | End: 2018-01-19
Attending: INTERNAL MEDICINE
Payer: MEDICARE

## 2018-01-19 ENCOUNTER — HOSPITAL ENCOUNTER (OUTPATIENT)
Dept: MAMMOGRAPHY | Facility: HOSPITAL | Age: 68
Discharge: HOME | End: 2018-01-19
Attending: INTERNAL MEDICINE
Payer: MEDICARE

## 2018-01-19 DIAGNOSIS — R92.8 ABNORMAL MAMMOGRAM: ICD-10-CM

## 2018-01-19 PROCEDURE — 77065 DX MAMMO INCL CAD UNI: CPT | Performed by: INTERNAL MEDICINE

## 2018-01-19 PROCEDURE — 77061 BREAST TOMOSYNTHESIS UNI: CPT | Performed by: INTERNAL MEDICINE

## 2018-01-19 PROCEDURE — 76642 ULTRASOUND BREAST LIMITED: CPT | Performed by: INTERNAL MEDICINE

## 2018-03-29 ENCOUNTER — NURSE TRIAGE (OUTPATIENT)
Dept: OTHER | Age: 68
End: 2018-03-29

## 2018-03-29 NOTE — TELEPHONE ENCOUNTER
Action Requested: Summary for Provider     []  Critical Lab, Recommendations Needed  [] Need Additional Advice  [x]   FYI    []   Need Orders  [] Need Medications Sent to Pharmacy  []  Other     SUMMARY:   Spoke with pt & cough with greenish mucous, chest

## 2018-03-30 ENCOUNTER — TELEPHONE (OUTPATIENT)
Dept: INTERNAL MEDICINE CLINIC | Facility: CLINIC | Age: 68
End: 2018-03-30

## 2018-03-30 RX ORDER — AZITHROMYCIN 250 MG/1
TABLET, FILM COATED ORAL
Qty: 6 TABLET | Refills: 0 | Status: SHIPPED | OUTPATIENT
Start: 2018-03-30 | End: 2018-04-03

## 2018-03-30 RX ORDER — ALBUTEROL SULFATE 90 UG/1
2 AEROSOL, METERED RESPIRATORY (INHALATION) EVERY 4 HOURS PRN
Qty: 1 INHALER | Refills: 1 | Status: SHIPPED | OUTPATIENT
Start: 2018-03-30 | End: 2018-08-08

## 2018-03-30 NOTE — TELEPHONE ENCOUNTER
Spoke to patient, she reports that she feels slightly better, continues to have a cough with a phlegmon production, was able to sleep last night, does not feel short of breath, does find she is still wheezy at times.   Not feeling worse, will treat you with

## 2018-03-30 NOTE — TELEPHONE ENCOUNTER
Called patient - verified patient's name and  - pt states she did not go to ER last night, her son is out of town and she had no transport. Pt states she still has a cough and congestion but no fever.      Pt states she received first of 2 pneumonia vacc

## 2018-05-03 RX ORDER — ENALAPRIL MALEATE 10 MG/1
TABLET ORAL
Qty: 180 TABLET | Refills: 0 | Status: SHIPPED | OUTPATIENT
Start: 2018-05-03 | End: 2018-08-08

## 2018-05-07 ENCOUNTER — TELEPHONE (OUTPATIENT)
Dept: INTERNAL MEDICINE CLINIC | Facility: CLINIC | Age: 68
End: 2018-05-07

## 2018-05-07 NOTE — TELEPHONE ENCOUNTER
Please follow-up with patient she missed her appointment today , is she  o'key? She needa  Annual  Physical  Visit as well

## 2018-06-13 ENCOUNTER — PATIENT OUTREACH (OUTPATIENT)
Dept: CASE MANAGEMENT | Age: 68
End: 2018-06-13

## 2018-06-24 RX ORDER — DULOXETIN HYDROCHLORIDE 60 MG/1
CAPSULE, DELAYED RELEASE ORAL
Qty: 90 CAPSULE | Refills: 0 | Status: SHIPPED | OUTPATIENT
Start: 2018-06-24 | End: 2018-11-03 | Stop reason: DRUGHIGH

## 2018-06-25 NOTE — TELEPHONE ENCOUNTER
Refill Protocol Appointment Criteria  · Appointment scheduled in the past 6 months or in the next 3 months  Recent Outpatient Visits            7 months ago [facility] for Alejandra Stovall, [address]

## 2018-06-26 RX ORDER — CARVEDILOL 3.12 MG/1
TABLET ORAL
Qty: 180 TABLET | Refills: 1 | Status: SHIPPED | OUTPATIENT
Start: 2018-06-26 | End: 2019-05-07

## 2018-06-27 NOTE — TELEPHONE ENCOUNTER
Hypertensive Medications  Protocol Criteria:  · Appointment scheduled in the past 6 months or in the next 3 months  · BMP or CMP in the past 12 months  · Creatinine result < 2  Recent Outpatient Visits            7 months ago Microhematuria    Jose Pagan

## 2018-08-08 ENCOUNTER — OFFICE VISIT (OUTPATIENT)
Dept: ORTHOPEDICS CLINIC | Facility: CLINIC | Age: 68
End: 2018-08-08
Payer: MEDICAID

## 2018-08-08 ENCOUNTER — HOSPITAL ENCOUNTER (OUTPATIENT)
Dept: GENERAL RADIOLOGY | Age: 68
Discharge: HOME | End: 2018-08-08
Attending: INTERNAL MEDICINE
Payer: MEDICARE

## 2018-08-08 ENCOUNTER — TELEPHONE (OUTPATIENT)
Dept: INTERNAL MEDICINE CLINIC | Facility: CLINIC | Age: 68
End: 2018-08-08

## 2018-08-08 ENCOUNTER — OFFICE VISIT (OUTPATIENT)
Dept: INTERNAL MEDICINE CLINIC | Facility: CLINIC | Age: 68
End: 2018-08-08
Payer: MEDICAID

## 2018-08-08 ENCOUNTER — LAB ENCOUNTER (OUTPATIENT)
Dept: LAB | Age: 68
End: 2018-08-08
Attending: INTERNAL MEDICINE
Payer: MEDICARE

## 2018-08-08 VITALS
DIASTOLIC BLOOD PRESSURE: 86 MMHG | BODY MASS INDEX: 35.44 KG/M2 | SYSTOLIC BLOOD PRESSURE: 154 MMHG | TEMPERATURE: 98 F | RESPIRATION RATE: 22 BRPM | HEIGHT: 63 IN | HEART RATE: 88 BPM | WEIGHT: 200 LBS

## 2018-08-08 DIAGNOSIS — M25.522 LEFT ELBOW PAIN: ICD-10-CM

## 2018-08-08 DIAGNOSIS — R53.83 OTHER FATIGUE: Primary | ICD-10-CM

## 2018-08-08 DIAGNOSIS — F32.4 MAJOR DEPRESSIVE DISORDER WITH SINGLE EPISODE, IN PARTIAL REMISSION (HCC): ICD-10-CM

## 2018-08-08 DIAGNOSIS — M25.532 LEFT WRIST PAIN: ICD-10-CM

## 2018-08-08 DIAGNOSIS — R29.898 WEAKNESS OF BOTH LOWER EXTREMITIES: ICD-10-CM

## 2018-08-08 DIAGNOSIS — Z91.81 AT RISK FOR FALLS: ICD-10-CM

## 2018-08-08 DIAGNOSIS — S52.125A CLOSED NONDISPLACED FRACTURE OF HEAD OF LEFT RADIUS, INITIAL ENCOUNTER: Primary | ICD-10-CM

## 2018-08-08 DIAGNOSIS — M17.10 KNEE ARTHROPATHY: ICD-10-CM

## 2018-08-08 DIAGNOSIS — I10 ESSENTIAL HYPERTENSION: ICD-10-CM

## 2018-08-08 DIAGNOSIS — I70.0 ATHEROSCLEROSIS OF AORTA (HCC): ICD-10-CM

## 2018-08-08 DIAGNOSIS — E66.01 SEVERE OBESITY (BMI 35.0-39.9) WITH COMORBIDITY (HCC): ICD-10-CM

## 2018-08-08 DIAGNOSIS — R53.83 OTHER FATIGUE: ICD-10-CM

## 2018-08-08 LAB
ALBUMIN SERPL BCP-MCNC: 3.7 G/DL (ref 3.5–4.8)
ALBUMIN/GLOB SERPL: 1.4 {RATIO} (ref 1–2)
ALP SERPL-CCNC: 125 U/L (ref 32–100)
ALT SERPL-CCNC: 24 U/L (ref 14–54)
ANION GAP SERPL CALC-SCNC: 8 MMOL/L (ref 0–18)
AST SERPL-CCNC: 17 U/L (ref 15–41)
BASOPHILS # BLD: 0 K/UL (ref 0–0.2)
BASOPHILS NFR BLD: 1 %
BILIRUB SERPL-MCNC: 0.4 MG/DL (ref 0.3–1.2)
BUN SERPL-MCNC: 26 MG/DL (ref 8–20)
BUN/CREAT SERPL: 40.6 (ref 10–20)
CALCIUM SERPL-MCNC: 8.8 MG/DL (ref 8.5–10.5)
CHLORIDE SERPL-SCNC: 105 MMOL/L (ref 95–110)
CO2 SERPL-SCNC: 26 MMOL/L (ref 22–32)
CREAT SERPL-MCNC: 0.64 MG/DL (ref 0.5–1.5)
EOSINOPHIL # BLD: 0.3 K/UL (ref 0–0.7)
EOSINOPHIL NFR BLD: 5 %
ERYTHROCYTE [DISTWIDTH] IN BLOOD BY AUTOMATED COUNT: 14.1 % (ref 11–15)
GLOBULIN PLAS-MCNC: 2.6 G/DL (ref 2.5–3.7)
GLUCOSE SERPL-MCNC: 92 MG/DL (ref 70–99)
HCT VFR BLD AUTO: 40.4 % (ref 35–48)
HGB BLD-MCNC: 13.2 G/DL (ref 12–16)
LYMPHOCYTES # BLD: 1.6 K/UL (ref 1–4)
LYMPHOCYTES NFR BLD: 23 %
MCH RBC QN AUTO: 26.2 PG (ref 27–32)
MCHC RBC AUTO-ENTMCNC: 32.8 G/DL (ref 32–37)
MCV RBC AUTO: 79.9 FL (ref 80–100)
MONOCYTES # BLD: 0.6 K/UL (ref 0–1)
MONOCYTES NFR BLD: 8 %
NEUTROPHILS # BLD AUTO: 4.4 K/UL (ref 1.8–7.7)
NEUTROPHILS NFR BLD: 63 %
OSMOLALITY UR CALC.SUM OF ELEC: 292 MOSM/KG (ref 275–295)
PATIENT FASTING: YES
PLATELET # BLD AUTO: 273 K/UL (ref 140–400)
PMV BLD AUTO: 8.7 FL (ref 7.4–10.3)
POTASSIUM SERPL-SCNC: 4 MMOL/L (ref 3.3–5.1)
PROT SERPL-MCNC: 6.3 G/DL (ref 5.9–8.4)
RBC # BLD AUTO: 5.05 M/UL (ref 3.7–5.4)
SODIUM SERPL-SCNC: 139 MMOL/L (ref 136–144)
TSH SERPL-ACNC: 0.99 UIU/ML (ref 0.45–5.33)
WBC # BLD AUTO: 7 K/UL (ref 4–11)

## 2018-08-08 PROCEDURE — 73070 X-RAY EXAM OF ELBOW: CPT | Performed by: INTERNAL MEDICINE

## 2018-08-08 PROCEDURE — 85025 COMPLETE CBC W/AUTO DIFF WBC: CPT

## 2018-08-08 PROCEDURE — 96160 PT-FOCUSED HLTH RISK ASSMT: CPT | Performed by: INTERNAL MEDICINE

## 2018-08-08 PROCEDURE — 99213 OFFICE O/P EST LOW 20 MIN: CPT | Performed by: ORTHOPAEDIC SURGERY

## 2018-08-08 PROCEDURE — 84443 ASSAY THYROID STIM HORMONE: CPT

## 2018-08-08 PROCEDURE — 73090 X-RAY EXAM OF FOREARM: CPT | Performed by: INTERNAL MEDICINE

## 2018-08-08 PROCEDURE — G0438 PPPS, INITIAL VISIT: HCPCS | Performed by: INTERNAL MEDICINE

## 2018-08-08 PROCEDURE — G0463 HOSPITAL OUTPT CLINIC VISIT: HCPCS | Performed by: ORTHOPAEDIC SURGERY

## 2018-08-08 PROCEDURE — 80053 COMPREHEN METABOLIC PANEL: CPT

## 2018-08-08 PROCEDURE — 73130 X-RAY EXAM OF HAND: CPT | Performed by: INTERNAL MEDICINE

## 2018-08-08 PROCEDURE — 36415 COLL VENOUS BLD VENIPUNCTURE: CPT

## 2018-08-08 RX ORDER — HYDROCODONE BITARTRATE AND ACETAMINOPHEN 5; 325 MG/1; MG/1
1 TABLET ORAL EVERY 6 HOURS PRN
Qty: 40 TABLET | Refills: 0 | Status: SHIPPED | OUTPATIENT
Start: 2018-08-08 | End: 2018-11-24

## 2018-08-08 RX ORDER — ENALAPRIL MALEATE 10 MG/1
TABLET ORAL
Qty: 180 TABLET | Refills: 0 | Status: SHIPPED | OUTPATIENT
Start: 2018-08-08 | End: 2018-11-10

## 2018-08-08 RX ORDER — ESCITALOPRAM OXALATE 10 MG/1
10 TABLET ORAL DAILY
Qty: 90 TABLET | Refills: 0 | Status: SHIPPED | OUTPATIENT
Start: 2018-08-08 | End: 2018-11-03

## 2018-08-08 NOTE — TELEPHONE ENCOUNTER
Pharm sent request to refill, have not been rec'd yet  Pharm asking to have a refill on   Please advise     Current Outpatient Prescriptions:  ENALAPRIL MALEATE 10 MG Oral Tab TAKE ONE TABLET BY MOUTH TWICE DAILY Disp: 180 tablet Rfl: 0

## 2018-08-08 NOTE — H&P
NURSING INTAKE COMMENTS: Patient presents with:  Consult: Left elbow injury-pt states she fell 2 wks ago. pt had Xr of hand/eblow/forearm. HPI: This 80year old female presents today with complaints of left elbow pain.   Patient suffered a mechanical PRE MENOP   • Diabetes Sister    • Cancer Self 48     UTERINE CANCER         Social History  Social History   Marital status:   Spouse name: N/A    Years of education: N/A  Number of children: N/A     Occupational History  None on file     Soci

## 2018-08-09 PROBLEM — Z91.81 AT RISK FOR FALLS: Status: ACTIVE | Noted: 2018-08-09

## 2018-08-09 NOTE — TELEPHONE ENCOUNTER
Hypertensive Medications  Protocol Criteria:  · Appointment scheduled in the past 6 months or in the next 3 months  · BMP or CMP in the past 12 months  · Creatinine result < 2  Recent Outpatient Visits            Today Closed nondisplaced fracture of head

## 2018-08-09 NOTE — PROGRESS NOTES
HPI:   Martina Mathews is a 80year old female who presents for a MA (Medicare Advantage) [facility] (Once per calendar year).     Patient reports that she has been falling more frequently lately, and that causes severe of walking, and she is spending a lot Advanced Directive:   She does have a Living Will but we do NOT have it on file in [address] [place] [address] Rd. Reviewed with patient importance of having living will, as he to provide a copy of document         She does have a POA but we do NOT have it on file in Epic.    Re hours as needed for Pain. CARVEDILOL 3.125 MG Oral Tab TAKE 1 TABLET BY MOUTH 2 TIMES DAILY WITH MEALS. DULOXETINE HCL 60 MG Oral Cap DR Particles TAKE 1 CAPSULE (60 MG TOTAL) BY MOUTH DAILY.    [DISCONTINUED] ENALAPRIL MALEATE 10 MG Oral Tab TAKE ONE T and hematuria  Hema/Lymph:  Negative for easy bleeding and easy bruising  Integumentary:  Negative for pruritus and rash  Musculoskeletal: Positive for bone/joint symptoms  Neurological:  Negative for gait disturbance, paresthesias  Psychiatric:  Negative Visual Acuity  Right Eye Visual Acuity: Corrected Right Eye Chart Acuity: 20/25   Left Eye Visual Acuity: Corrected Left Eye Chart Acuity: 20/25   Both Eyes Visual Acuity: Corrected Both Eyes Chart Acuity: 20/25   Able To Tolerate Visual Acuity: Yes 80year old female who presents for a Medicare Assessment. PLAN SUMMARY:   Diagnoses and all orders for this visit:    Other fatigue multifactorial, rule out hypothyroidism  -     COMP METABOLIC PANEL (14);  Future  -     CBC WITH DIFFERENTIAL WITH PLAT orthopedic specialist  Other orders  -     escitalopram 10 MG Oral Tab; Take 1 tablet (10 mg total) by mouth daily.  -     HYDROcodone-acetaminophen 5-325 MG Oral Tab; Take 1 tablet by mouth every 6 (six) hours as needed for Pain.         Diet assessment: jacques Diabetics, FHx Glaucoma, AA>50, > 65 No flowsheet data found. Bone Density Screening      Dexascan Every two years No results found for this or any previous visit. No flowsheet data found.     Pap and Pelvic      Pap: Every 3 yrs age 21-65 or Pap 08/08/2018 4.0     POTASSIUM (P) (mmol/L)   Date Value   02/16/2016 4.0    No flowsheet data found.     Creatinine  Annually Creatinine (mg/dL)   Date Value   08/08/2018 0.64     CREATININE (P) (mg/dL)   Date Value   02/16/2016 0.58    No flowsheet data f

## 2018-08-09 NOTE — TELEPHONE ENCOUNTER
MAR Comment Waste Waste Unit        Medication Detail      Disp Refills Start End    ENALAPRIL MALEATE 10 MG Oral Tab 180 tablet 0 8/8/2018     Sig: TAKE 1 TABLET BY MOUTH TWICE A DAY    E-Prescribing Status: Receipt confirmed by pharmacy (8/8/2018  8:06 P

## 2018-09-18 ENCOUNTER — TELEPHONE (OUTPATIENT)
Dept: INTERNAL MEDICINE CLINIC | Facility: CLINIC | Age: 68
End: 2018-09-18

## 2018-09-18 DIAGNOSIS — M81.0 AGE-RELATED OSTEOPOROSIS WITHOUT CURRENT PATHOLOGICAL FRACTURE: Primary | ICD-10-CM

## 2018-09-18 NOTE — TELEPHONE ENCOUNTER
Patient was seen 08/08/2018 and per after visit summary was to have Dexa Scan but order was never placed on the chart. Please place the order and contact the patient.

## 2018-09-29 ENCOUNTER — HOSPITAL ENCOUNTER (OUTPATIENT)
Dept: BONE DENSITY | Age: 68
Discharge: HOME | End: 2018-09-29
Attending: INTERNAL MEDICINE
Payer: MEDICARE

## 2018-09-29 DIAGNOSIS — M81.0 AGE-RELATED OSTEOPOROSIS WITHOUT CURRENT PATHOLOGICAL FRACTURE: ICD-10-CM

## 2018-09-29 PROCEDURE — 77080 DXA BONE DENSITY AXIAL: CPT | Performed by: INTERNAL MEDICINE

## 2018-10-04 ENCOUNTER — TELEPHONE (OUTPATIENT)
Dept: INTERNAL MEDICINE CLINIC | Facility: CLINIC | Age: 68
End: 2018-10-04

## 2018-10-04 RX ORDER — DULOXETIN HYDROCHLORIDE 60 MG/1
CAPSULE, DELAYED RELEASE ORAL
Qty: 90 CAPSULE | Refills: 1 | OUTPATIENT
Start: 2018-10-04

## 2018-10-04 RX ORDER — DULOXETIN HYDROCHLORIDE 30 MG/1
30 CAPSULE, DELAYED RELEASE ORAL DAILY
Qty: 30 CAPSULE | Refills: 0 | Status: SHIPPED | OUTPATIENT
Start: 2018-10-04 | End: 2018-11-25 | Stop reason: ALTCHOICE

## 2018-10-04 NOTE — TELEPHONE ENCOUNTER
Patient returned call, advised of Dexa Bone results with understanding. Also advised Dr Trinity Olmstead left message regarding her refill request (see encounter 9/29/18). Patient understands and will be waiting for return call from Dr Trinity Olmstead.     Notes recorded by Diane Hunter

## 2018-10-04 NOTE — TELEPHONE ENCOUNTER
Patient returned call, advised Dr Jeovanny Bailey will return call to further discuss.  (See phone encounter 10/4/18)

## 2018-10-05 NOTE — TELEPHONE ENCOUNTER
Spoke to patient, she feels lethargic, depressed, no motivation to do anything, her  he was in his wife live in the same building. I advised patient to look for a psychiatrist call insurance and find a list of providers.   In meanwhile we will decrease dul

## 2018-10-29 ENCOUNTER — NURSE TRIAGE (OUTPATIENT)
Dept: INTERNAL MEDICINE CLINIC | Facility: CLINIC | Age: 68
End: 2018-10-29

## 2018-10-29 NOTE — TELEPHONE ENCOUNTER
Action Requested: Summary for Provider     []  Critical Lab, Recommendations Needed  [] Need Additional Advice  [x]   FYI    []   Need Orders  [] Need Medications Sent to Pharmacy  []  Other     SUMMARY: Per protocol advised pt to seek emergency assessment

## 2018-10-31 NOTE — TELEPHONE ENCOUNTER
When patient arrives home she needs to be evaluated she needs to see any physician available if I do not have openings this week or go to emergency room for evaluation

## 2018-11-01 NOTE — TELEPHONE ENCOUNTER
Patient contacted. She did go to Urgent care in [place].  154/9? [place] Patient was injected a diuretic. Was prescribed a water pill, furosemide 40mg 1 po daily. Patient has appt 11/2/18 with Dr Felix Quinn.  She will bring copies of discharge report and medication s

## 2018-11-02 ENCOUNTER — OFFICE VISIT (OUTPATIENT)
Dept: INTERNAL MEDICINE CLINIC | Facility: CLINIC | Age: 68
End: 2018-11-02
Payer: MEDICARE

## 2018-11-02 ENCOUNTER — HOSPITAL ENCOUNTER (OUTPATIENT)
Dept: ULTRASOUND IMAGING | Facility: HOSPITAL | Age: 68
Discharge: HOME | End: 2018-11-02
Attending: INTERNAL MEDICINE
Payer: MEDICARE

## 2018-11-02 VITALS
HEART RATE: 77 BPM | DIASTOLIC BLOOD PRESSURE: 83 MMHG | BODY MASS INDEX: 36 KG/M2 | SYSTOLIC BLOOD PRESSURE: 138 MMHG | RESPIRATION RATE: 18 BRPM | WEIGHT: 204 LBS

## 2018-11-02 DIAGNOSIS — F33.1 DEPRESSION, MAJOR, RECURRENT, MODERATE (HCC): ICD-10-CM

## 2018-11-02 DIAGNOSIS — I10 ESSENTIAL HYPERTENSION: ICD-10-CM

## 2018-11-02 DIAGNOSIS — R60.0 LOCALIZED EDEMA: ICD-10-CM

## 2018-11-02 DIAGNOSIS — I10 ESSENTIAL HYPERTENSION: Primary | ICD-10-CM

## 2018-11-02 PROCEDURE — 93971 EXTREMITY STUDY: CPT | Performed by: INTERNAL MEDICINE

## 2018-11-02 PROCEDURE — 83880 ASSAY OF NATRIURETIC PEPTIDE: CPT

## 2018-11-02 PROCEDURE — 80053 COMPREHEN METABOLIC PANEL: CPT

## 2018-11-02 PROCEDURE — 85025 COMPLETE CBC W/AUTO DIFF WBC: CPT

## 2018-11-02 PROCEDURE — 99214 OFFICE O/P EST MOD 30 MIN: CPT | Performed by: INTERNAL MEDICINE

## 2018-11-02 PROCEDURE — G0463 HOSPITAL OUTPT CLINIC VISIT: HCPCS | Performed by: INTERNAL MEDICINE

## 2018-11-02 PROCEDURE — 36415 COLL VENOUS BLD VENIPUNCTURE: CPT

## 2018-11-02 RX ORDER — FUROSEMIDE 40 MG/1
40 TABLET ORAL 2 TIMES DAILY
COMMUNITY
End: 2019-03-19

## 2018-11-02 NOTE — PROGRESS NOTES
HPI:    Patient ID: Darcy Pelaez is a 80year old female.   Presents for evaluation of the leg swelling    HPI  Patient returned from [facility] was visiting her family, states that she went to a restaurant and they ate regular food and all of a sudden Medications:  furosemide 40 MG Oral Tab Take 40 mg by mouth 2 (two) times daily. Disp:  Rfl:    DULoxetine HCl 30 MG Oral Cap DR Particles Take 1 capsule (30 mg total) by mouth daily.  Discontinue duloxetine 60 mg Disp: 30 capsule Rfl: 0   escitalopram 10 M hepatosplenomegaly. There is no tenderness. There is no rebound and no guarding. Musculoskeletal: Normal range of motion. Homans sign negative left leg calf is soft   Lymphadenopathy:     She has no cervical adenopathy.    Neurological: She is alert and

## 2018-11-03 RX ORDER — ESCITALOPRAM OXALATE 10 MG/1
TABLET ORAL
Qty: 90 TABLET | Refills: 0 | Status: SHIPPED | OUTPATIENT
Start: 2018-11-03 | End: 2019-03-19

## 2018-11-03 NOTE — TELEPHONE ENCOUNTER
Refill passed per [facility] protocol.   Refill Protocol Appointment Criteria  · Appointment scheduled in the past 6 months or in the next 3 months  Recent Outpatient Visits            Yesterday Essential hypertension    [facility], [address] [place] [address]

## 2018-11-09 RX ORDER — DULOXETIN HYDROCHLORIDE 30 MG/1
30 CAPSULE, DELAYED RELEASE ORAL DAILY
Qty: 30 CAPSULE | Refills: 2 | OUTPATIENT
Start: 2018-11-09

## 2018-11-10 RX ORDER — ENALAPRIL MALEATE 10 MG/1
TABLET ORAL
Qty: 180 TABLET | Refills: 0 | Status: SHIPPED | OUTPATIENT
Start: 2018-11-10 | End: 2020-05-04

## 2018-11-13 ENCOUNTER — TELEPHONE (OUTPATIENT)
Dept: INTERNAL MEDICINE CLINIC | Facility: CLINIC | Age: 68
End: 2018-11-13

## 2018-11-13 NOTE — TELEPHONE ENCOUNTER
Pt states does not know why CSS transferred her as she just wants to schedule a f/u with NK about the matters discussed during the 11/2/18 OV.  When asked if anything was worsening pt states NK had told her to stop taking a diuretic that she was prescribed

## 2018-11-23 ENCOUNTER — TELEPHONE (OUTPATIENT)
Dept: OTHER | Age: 68
End: 2018-11-23

## 2018-11-23 NOTE — TELEPHONE ENCOUNTER
Action Requested: Summary for Provider     []  Critical Lab, Recommendations Needed  [x] Need Additional Advice  []   FYI    []   Need Orders  [] Need Medications Sent to Pharmacy  []  Other     SUMMARY:appt made for tomorrow-lov 11/2/18- stated since mahogany

## 2018-11-24 ENCOUNTER — APPOINTMENT (OUTPATIENT)
Dept: LAB | Age: 68
End: 2018-11-24
Attending: INTERNAL MEDICINE
Payer: MEDICARE

## 2018-11-24 ENCOUNTER — OFFICE VISIT (OUTPATIENT)
Dept: INTERNAL MEDICINE CLINIC | Facility: CLINIC | Age: 68
End: 2018-11-24
Payer: MEDICAID

## 2018-11-24 VITALS
DIASTOLIC BLOOD PRESSURE: 90 MMHG | HEIGHT: 63 IN | RESPIRATION RATE: 20 BRPM | WEIGHT: 203.63 LBS | TEMPERATURE: 98 F | HEART RATE: 80 BPM | SYSTOLIC BLOOD PRESSURE: 126 MMHG | BODY MASS INDEX: 36.08 KG/M2

## 2018-11-24 DIAGNOSIS — I87.2 VENOUS INSUFFICIENCY OF BOTH LOWER EXTREMITIES: ICD-10-CM

## 2018-11-24 DIAGNOSIS — F32.4 MAJOR DEPRESSIVE DISORDER WITH SINGLE EPISODE, IN PARTIAL REMISSION (HCC): ICD-10-CM

## 2018-11-24 DIAGNOSIS — M17.12 ARTHRITIS OF LEFT KNEE: ICD-10-CM

## 2018-11-24 DIAGNOSIS — I87.2 VENOUS INSUFFICIENCY OF LEFT LEG: ICD-10-CM

## 2018-11-24 DIAGNOSIS — F43.10 POSTTRAUMATIC STRESS DISORDER: ICD-10-CM

## 2018-11-24 DIAGNOSIS — N39.42 URINARY INCONTINENCE WITHOUT SENSORY AWARENESS: Primary | ICD-10-CM

## 2018-11-24 DIAGNOSIS — N39.42 URINARY INCONTINENCE WITHOUT SENSORY AWARENESS: ICD-10-CM

## 2018-11-24 PROCEDURE — 99214 OFFICE O/P EST MOD 30 MIN: CPT | Performed by: INTERNAL MEDICINE

## 2018-11-24 PROCEDURE — 81001 URINALYSIS AUTO W/SCOPE: CPT

## 2018-11-24 PROCEDURE — 87086 URINE CULTURE/COLONY COUNT: CPT

## 2018-11-24 RX ORDER — HYDROCODONE BITARTRATE AND ACETAMINOPHEN 5; 325 MG/1; MG/1
1 TABLET ORAL EVERY 6 HOURS PRN
Qty: 60 TABLET | Refills: 0 | Status: SHIPPED | OUTPATIENT
Start: 2018-11-24 | End: 2019-03-19

## 2018-11-25 RX ORDER — DULOXETIN HYDROCHLORIDE 30 MG/1
30 CAPSULE, DELAYED RELEASE ORAL DAILY
Qty: 30 CAPSULE | Refills: 0 | OUTPATIENT
Start: 2018-11-25

## 2018-11-25 NOTE — PROGRESS NOTES
HPI:    Patient ID: Eleanor Augustine is a 80year old female.   Presents for evaluation of the leg edema, urinary bladder incontinence, depression    HPI   Patient reports that since trip to [facility] several weeks ago she developed severe urinary bladder Negative for polydipsia and polyphagia  Integumentary:  Negative for pruritus and rash  Neurological: Positive for gait disturbance due to pain in the left knee paresthesias.    Psychiatric:  Negative for inappropriate interaction and psychiatric symptoms range of motion. Neck supple. No JVD present. Cardiovascular: Normal rate, regular rhythm and normal heart sounds. No murmur heard. Edema not present. Pulmonary/Chest: Effort normal and breath sounds normal. No respiratory distress.  She has no whee every 6 (six) hours as needed for Pain. • Elastic Bandages & Supports (MEDICAL COMPRESSION STOCKINGS) Does not apply Misc 2 each 2     Si each by Does not apply route daily.  Compression 20-30 mmHg knee high       Imaging & Referrals:  OP REFERRAL TO

## 2018-11-26 NOTE — TELEPHONE ENCOUNTER
OV 11-2-18  Essential hypertension  (primary encounter diagnosis) controlled on current medication, will check CMP  Localized edema rule out DVT will get stat venous Doppler left lower extremity, patient to come continue to wear compression support stockin

## 2018-11-27 RX ORDER — DULOXETIN HYDROCHLORIDE 30 MG/1
30 CAPSULE, DELAYED RELEASE ORAL DAILY
Qty: 30 CAPSULE | Refills: 1 | OUTPATIENT
Start: 2018-11-27

## 2018-11-27 RX ORDER — HYDROCODONE BITARTRATE AND ACETAMINOPHEN 5; 325 MG/1; MG/1
1 TABLET ORAL EVERY 6 HOURS PRN
Qty: 60 TABLET | Refills: 1 | Status: CANCELLED | OUTPATIENT
Start: 2018-11-27

## 2018-11-27 NOTE — TELEPHONE ENCOUNTER
Pt states that her request for the duloxetine 30mg keeps getting denied. According her the AVS from her 11/24 OV, pt is  to take 30mg of the duloxetine and the lexapro 10mg and make an appt with the psychiatrist.  She has not yet made that appt.   Please a

## 2018-12-06 ENCOUNTER — TELEPHONE (OUTPATIENT)
Dept: INTERNAL MEDICINE CLINIC | Facility: CLINIC | Age: 68
End: 2018-12-06

## 2018-12-06 DIAGNOSIS — R32 URINARY INCONTINENCE, UNSPECIFIED TYPE: Primary | ICD-10-CM

## 2018-12-06 NOTE — TELEPHONE ENCOUNTER
Pt is calling to request a referral for urogynecology with Dr Angelina Stallworth. Please advise thank you.

## 2019-01-16 ENCOUNTER — TELEPHONE (OUTPATIENT)
Dept: INTERNAL MEDICINE CLINIC | Facility: CLINIC | Age: 69
End: 2019-01-16

## 2019-01-16 NOTE — TELEPHONE ENCOUNTER
Pt called requesting a call back    Pt called stating form was faxed for medical consultation from dentist office Dr Austin Landry

## 2019-01-19 NOTE — TELEPHONE ENCOUNTER
Spoke with pt. Pt. Made appt for jan. 25 (SDA approved by nK) and will bring form to complete by nk. Pt. Verbalized understanding.

## 2019-01-25 ENCOUNTER — APPOINTMENT (OUTPATIENT)
Dept: LAB | Age: 69
End: 2019-01-25
Attending: OBSTETRICS & GYNECOLOGY
Payer: MEDICARE

## 2019-01-25 ENCOUNTER — OFFICE VISIT (OUTPATIENT)
Dept: INTERNAL MEDICINE CLINIC | Facility: CLINIC | Age: 69
End: 2019-01-25
Payer: MEDICARE

## 2019-01-25 ENCOUNTER — OFFICE VISIT (OUTPATIENT)
Dept: UROLOGY | Facility: HOSPITAL | Age: 69
End: 2019-01-25
Attending: OBSTETRICS & GYNECOLOGY
Payer: MEDICARE

## 2019-01-25 VITALS
RESPIRATION RATE: 20 BRPM | WEIGHT: 200 LBS | HEART RATE: 82 BPM | SYSTOLIC BLOOD PRESSURE: 138 MMHG | DIASTOLIC BLOOD PRESSURE: 79 MMHG | BODY MASS INDEX: 35 KG/M2

## 2019-01-25 VITALS
BODY MASS INDEX: 35.44 KG/M2 | SYSTOLIC BLOOD PRESSURE: 134 MMHG | DIASTOLIC BLOOD PRESSURE: 84 MMHG | HEIGHT: 63 IN | WEIGHT: 200 LBS

## 2019-01-25 DIAGNOSIS — N39.41 URGE INCONTINENCE: Primary | ICD-10-CM

## 2019-01-25 DIAGNOSIS — E66.01 SEVERE OBESITY (BMI 35.0-39.9) WITH COMORBIDITY (HCC): ICD-10-CM

## 2019-01-25 DIAGNOSIS — F32.4 MAJOR DEPRESSIVE DISORDER WITH SINGLE EPISODE, IN PARTIAL REMISSION (HCC): ICD-10-CM

## 2019-01-25 DIAGNOSIS — I10 ESSENTIAL HYPERTENSION: Primary | ICD-10-CM

## 2019-01-25 DIAGNOSIS — N39.3 FEMALE STRESS INCONTINENCE: ICD-10-CM

## 2019-01-25 DIAGNOSIS — E87.6 HYPOKALEMIA: ICD-10-CM

## 2019-01-25 DIAGNOSIS — N95.2 POSTMENOPAUSAL ATROPHIC VAGINITIS: ICD-10-CM

## 2019-01-25 DIAGNOSIS — N81.84 PELVIC MUSCLE WASTING: ICD-10-CM

## 2019-01-25 DIAGNOSIS — R35.1 NOCTURIA: ICD-10-CM

## 2019-01-25 LAB
ANION GAP SERPL CALC-SCNC: 7 MMOL/L (ref 0–18)
BUN SERPL-MCNC: 20 MG/DL (ref 8–20)
BUN/CREAT SERPL: 30.8 (ref 10–20)
CALCIUM SERPL-MCNC: 9.3 MG/DL (ref 8.5–10.5)
CHLORIDE SERPL-SCNC: 106 MMOL/L (ref 95–110)
CO2 SERPL-SCNC: 25 MMOL/L (ref 22–32)
CREAT SERPL-MCNC: 0.65 MG/DL (ref 0.5–1.5)
GLUCOSE SERPL-MCNC: 97 MG/DL (ref 70–99)
OSMOLALITY UR CALC.SUM OF ELEC: 289 MOSM/KG (ref 275–295)
POTASSIUM SERPL-SCNC: 3.5 MMOL/L (ref 3.3–5.1)
SODIUM SERPL-SCNC: 138 MMOL/L (ref 136–144)

## 2019-01-25 PROCEDURE — 36415 COLL VENOUS BLD VENIPUNCTURE: CPT

## 2019-01-25 PROCEDURE — G0463 HOSPITAL OUTPT CLINIC VISIT: HCPCS | Performed by: INTERNAL MEDICINE

## 2019-01-25 PROCEDURE — 80048 BASIC METABOLIC PNL TOTAL CA: CPT

## 2019-01-25 PROCEDURE — G0009 ADMIN PNEUMOCOCCAL VACCINE: HCPCS | Performed by: INTERNAL MEDICINE

## 2019-01-25 PROCEDURE — 90732 PPSV23 VACC 2 YRS+ SUBQ/IM: CPT | Performed by: INTERNAL MEDICINE

## 2019-01-25 PROCEDURE — 99201 HC OUTPT EVAL AND MGNT NEW PT LEVEL 1: CPT

## 2019-01-25 PROCEDURE — 99214 OFFICE O/P EST MOD 30 MIN: CPT | Performed by: INTERNAL MEDICINE
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
## 2019-01-26 NOTE — PROGRESS NOTES
HPI:    Patient ID: Darcy Pelaez is a 80year old female resents for follow-up on hypertension. Patient is seeing. HPI  Patient is seeing dentist and she requires deconstruction, during last visit had elevated blood pressure up to 140/125.   Patient 1 TABLET BY MOUTH EVERY DAY Disp: 90 tablet Rfl: 0   furosemide 40 MG Oral Tab Take 40 mg by mouth 2 (two) times daily. Disp:  Rfl:    CARVEDILOL 3.125 MG Oral Tab TAKE 1 TABLET BY MOUTH 2 TIMES DAILY WITH MEALS.  Disp: 180 tablet Rfl: 1   ERGOCALCIFEROL 50 chance to work  Hypokalemia we will recheck BMP   Severe obesity (bmi 35.0-39. 9) with comorbidity (hcc) patient will continue to work on the weight loss  Major depressive disorder with single episode, in partial remission (hcc) continue counseling and joyce

## 2019-02-04 ENCOUNTER — OFFICE VISIT (OUTPATIENT)
Dept: UROLOGY | Facility: HOSPITAL | Age: 69
End: 2019-02-04
Attending: OBSTETRICS & GYNECOLOGY
Payer: MEDICARE

## 2019-02-04 VITALS — BODY MASS INDEX: 35 KG/M2 | DIASTOLIC BLOOD PRESSURE: 100 MMHG | SYSTOLIC BLOOD PRESSURE: 160 MMHG | WEIGHT: 200 LBS

## 2019-02-04 DIAGNOSIS — N39.3 FEMALE STRESS INCONTINENCE: Primary | ICD-10-CM

## 2019-02-04 LAB
BLOOD URINE: NEGATIVE
CONTROL RUN WITHIN 24 HOURS?: YES
LEUKOCYTE ESTERASE URINE: NEGATIVE
NITRITE URINE: NEGATIVE

## 2019-02-04 PROCEDURE — 51784 ANAL/URINARY MUSCLE STUDY: CPT

## 2019-02-04 PROCEDURE — 51797 INTRAABDOMINAL PRESSURE TEST: CPT

## 2019-02-04 PROCEDURE — 81002 URINALYSIS NONAUTO W/O SCOPE: CPT

## 2019-02-04 PROCEDURE — 51729 CYSTOMETROGRAM W/VP&UP: CPT

## 2019-02-04 PROCEDURE — 51741 ELECTRO-UROFLOWMETRY FIRST: CPT

## 2019-02-04 NOTE — PROGRESS NOTES
Patient aware of elevated B/P reading - is tense - does not want a re check of B/P - reports that she will take her B/P medication and follow up with PCP is needed

## 2019-02-04 NOTE — PATIENT INSTRUCTIONS
[facility]  [address] [place] [address],[address]  [place], [address] [facility]  Office: [phone]      Urodynamic Testing Discharge Instructions: There are NO dietary or activity restrictions. You may resume your normal schedule.       You may hav

## 2019-02-04 NOTE — PROCEDURES
Patient here for urodynamic testing. Procedure explained and confirmed by patient. See evaluation form for results. Both verbal and written discharge instructions were given.   Patient tolerated procedure well and will follow up with Dr. Nii Olea Infusion:  Water Rate 50 mL/min  Temp:  Room  Position:  [x]  Sit  []  Stand  []  Supine  First sensation:   18 mL  First desire to void:   100 mL  Strong desire to void:  198 mL  Maximum cystometric capacity:   328 mL  Detrusor Activity:  []  Unstable

## 2019-02-13 ENCOUNTER — TELEPHONE (OUTPATIENT)
Dept: INTERNAL MEDICINE CLINIC | Facility: CLINIC | Age: 69
End: 2019-02-13

## 2019-02-13 NOTE — TELEPHONE ENCOUNTER
Pt stts order form was completed on 2/4 for incontinence supplies but missing MD signature. Please advise.

## 2019-02-15 ENCOUNTER — OFFICE VISIT (OUTPATIENT)
Dept: UROLOGY | Facility: HOSPITAL | Age: 69
End: 2019-02-15
Attending: OBSTETRICS & GYNECOLOGY
Payer: MEDICARE

## 2019-02-15 VITALS — HEIGHT: 63 IN | WEIGHT: 200 LBS | BODY MASS INDEX: 35.44 KG/M2

## 2019-02-15 DIAGNOSIS — N39.41 URGE INCONTINENCE: ICD-10-CM

## 2019-02-15 DIAGNOSIS — N81.84 PELVIC MUSCLE WASTING: ICD-10-CM

## 2019-02-15 DIAGNOSIS — N39.3 FEMALE STRESS INCONTINENCE: Primary | ICD-10-CM

## 2019-02-15 PROCEDURE — 99211 OFF/OP EST MAY X REQ PHY/QHP: CPT

## 2019-02-15 RX ORDER — OXYBUTYNIN CHLORIDE 5 MG/1
5 TABLET, EXTENDED RELEASE ORAL DAILY
Qty: 90 TABLET | Refills: 3 | Status: SHIPPED | OUTPATIENT
Start: 2019-02-15 | End: 2020-01-31

## 2019-02-15 NOTE — PATIENT INSTRUCTIONS
Start with oxybutynin ER 5mg daily, call with an update in 4 weeks, sooner as needed    Kimberly Hernandez    For women experiencing urinary incontinence from overactive bladder or pelvic weakness, retraining the gloria volume of fluids may also have an impact on urinating at night. Chew gum, suck on a hard candy or take small sips of water if you become thirsty instead of drinking large amounts.   Diuretic medications such as high blood pressure medicine should be taken

## 2019-02-15 NOTE — PROGRESS NOTES
Pt presents w/ initial c/o UI  Urodynamic testing undergone without complication.   Results reviewed with patient  119/10cc & 350/3cc  No DO  +ROSY @ 100cc  [facility] 328cc    Discussed with patient mgmt options for ROSY    Thorough discussion of surgical risks, be

## 2019-03-19 ENCOUNTER — LAB ENCOUNTER (OUTPATIENT)
Dept: LAB | Age: 69
End: 2019-03-19
Attending: INTERNAL MEDICINE
Payer: MEDICARE

## 2019-03-19 ENCOUNTER — OFFICE VISIT (OUTPATIENT)
Dept: INTERNAL MEDICINE CLINIC | Facility: CLINIC | Age: 69
End: 2019-03-19
Payer: MEDICARE

## 2019-03-19 VITALS
RESPIRATION RATE: 18 BRPM | HEART RATE: 74 BPM | DIASTOLIC BLOOD PRESSURE: 96 MMHG | HEIGHT: 63 IN | SYSTOLIC BLOOD PRESSURE: 166 MMHG | TEMPERATURE: 98 F | BODY MASS INDEX: 36.32 KG/M2 | WEIGHT: 205 LBS

## 2019-03-19 DIAGNOSIS — R32 URINARY INCONTINENCE, UNSPECIFIED TYPE: ICD-10-CM

## 2019-03-19 DIAGNOSIS — E78.00 PURE HYPERCHOLESTEROLEMIA: ICD-10-CM

## 2019-03-19 DIAGNOSIS — F32.4 MAJOR DEPRESSIVE DISORDER WITH SINGLE EPISODE, IN PARTIAL REMISSION (HCC): ICD-10-CM

## 2019-03-19 DIAGNOSIS — M17.12 ARTHRITIS OF LEFT KNEE: ICD-10-CM

## 2019-03-19 DIAGNOSIS — I10 ESSENTIAL HYPERTENSION: Primary | ICD-10-CM

## 2019-03-19 DIAGNOSIS — Z12.31 BREAST CANCER SCREENING BY MAMMOGRAM: ICD-10-CM

## 2019-03-19 DIAGNOSIS — I10 ESSENTIAL HYPERTENSION: ICD-10-CM

## 2019-03-19 DIAGNOSIS — F43.10 POSTTRAUMATIC STRESS DISORDER: ICD-10-CM

## 2019-03-19 DIAGNOSIS — I70.0 ATHEROSCLEROSIS OF AORTA (HCC): ICD-10-CM

## 2019-03-19 LAB
ALBUMIN SERPL-MCNC: 3.7 G/DL (ref 3.4–5)
ALBUMIN/GLOB SERPL: 1 {RATIO} (ref 1–2)
ALP LIVER SERPL-CCNC: 133 U/L (ref 55–142)
ALT SERPL-CCNC: 25 U/L (ref 13–56)
ANION GAP SERPL CALC-SCNC: 8 MMOL/L (ref 0–18)
AST SERPL-CCNC: 8 U/L (ref 15–37)
BASOPHILS # BLD AUTO: 0.05 X10(3) UL (ref 0–0.2)
BASOPHILS NFR BLD AUTO: 0.6 %
BILIRUB SERPL-MCNC: 0.5 MG/DL (ref 0.1–2)
BUN BLD-MCNC: 27 MG/DL (ref 7–18)
BUN/CREAT SERPL: 33.8 (ref 10–20)
CALCIUM BLD-MCNC: 9.4 MG/DL (ref 8.5–10.1)
CHLORIDE SERPL-SCNC: 108 MMOL/L (ref 98–107)
CHOLEST SMN-MCNC: 211 MG/DL (ref ?–200)
CK SERPL-CCNC: 37 U/L (ref 26–192)
CO2 SERPL-SCNC: 27 MMOL/L (ref 21–32)
CREAT BLD-MCNC: 0.8 MG/DL (ref 0.55–1.02)
DEPRECATED RDW RBC AUTO: 39.6 FL (ref 35.1–46.3)
EOSINOPHIL # BLD AUTO: 0.18 X10(3) UL (ref 0–0.7)
EOSINOPHIL NFR BLD AUTO: 2.3 %
ERYTHROCYTE [DISTWIDTH] IN BLOOD BY AUTOMATED COUNT: 13.2 % (ref 11–15)
GLOBULIN PLAS-MCNC: 3.6 G/DL (ref 2.8–4.4)
GLUCOSE BLD-MCNC: 106 MG/DL (ref 70–99)
HCT VFR BLD AUTO: 42.3 % (ref 35–48)
HDLC SERPL-MCNC: 60 MG/DL (ref 40–59)
HGB BLD-MCNC: 13.2 G/DL (ref 12–16)
IMM GRANULOCYTES # BLD AUTO: 0.03 X10(3) UL (ref 0–1)
IMM GRANULOCYTES NFR BLD: 0.4 %
LDLC SERPL CALC-MCNC: 134 MG/DL (ref ?–100)
LYMPHOCYTES # BLD AUTO: 1.57 X10(3) UL (ref 1–4)
LYMPHOCYTES NFR BLD AUTO: 19.6 %
M PROTEIN MFR SERPL ELPH: 7.3 G/DL (ref 6.4–8.2)
MCH RBC QN AUTO: 25.9 PG (ref 26–34)
MCHC RBC AUTO-ENTMCNC: 31.2 G/DL (ref 31–37)
MCV RBC AUTO: 83.1 FL (ref 80–100)
MONOCYTES # BLD AUTO: 0.66 X10(3) UL (ref 0.1–1)
MONOCYTES NFR BLD AUTO: 8.3 %
NEUTROPHILS # BLD AUTO: 5.5 X10 (3) UL (ref 1.5–7.7)
NEUTROPHILS # BLD AUTO: 5.5 X10(3) UL (ref 1.5–7.7)
NEUTROPHILS NFR BLD AUTO: 68.8 %
NONHDLC SERPL-MCNC: 151 MG/DL (ref ?–130)
OSMOLALITY SERPL CALC.SUM OF ELEC: 302 MOSM/KG (ref 275–295)
PLATELET # BLD AUTO: 266 10(3)UL (ref 150–450)
POTASSIUM SERPL-SCNC: 3.9 MMOL/L (ref 3.5–5.1)
RBC # BLD AUTO: 5.09 X10(6)UL (ref 3.8–5.3)
SODIUM SERPL-SCNC: 143 MMOL/L (ref 136–145)
TRIGL SERPL-MCNC: 87 MG/DL (ref 30–149)
VLDLC SERPL CALC-MCNC: 17 MG/DL (ref 0–30)
WBC # BLD AUTO: 8 X10(3) UL (ref 4–11)

## 2019-03-19 PROCEDURE — 36415 COLL VENOUS BLD VENIPUNCTURE: CPT

## 2019-03-19 PROCEDURE — 99397 PER PM REEVAL EST PAT 65+ YR: CPT | Performed by: INTERNAL MEDICINE

## 2019-03-19 PROCEDURE — 82550 ASSAY OF CK (CPK): CPT

## 2019-03-19 PROCEDURE — G0439 PPPS, SUBSEQ VISIT: HCPCS | Performed by: INTERNAL MEDICINE

## 2019-03-19 PROCEDURE — 96160 PT-FOCUSED HLTH RISK ASSMT: CPT | Performed by: INTERNAL MEDICINE

## 2019-03-19 PROCEDURE — 85025 COMPLETE CBC W/AUTO DIFF WBC: CPT

## 2019-03-19 PROCEDURE — 80053 COMPREHEN METABOLIC PANEL: CPT

## 2019-03-19 PROCEDURE — 80061 LIPID PANEL: CPT

## 2019-03-19 RX ORDER — HYDROCODONE BITARTRATE AND ACETAMINOPHEN 5; 325 MG/1; MG/1
1 TABLET ORAL EVERY 6 HOURS PRN
Qty: 60 TABLET | Refills: 0 | Status: SHIPPED | OUTPATIENT
Start: 2019-03-19 | End: 2019-11-08

## 2019-03-19 RX ORDER — BUPROPION HYDROCHLORIDE 150 MG/1
150 TABLET ORAL DAILY
Qty: 90 TABLET | Refills: 0 | Status: SHIPPED | OUTPATIENT
Start: 2019-03-19 | End: 2019-06-09

## 2019-03-19 RX ORDER — CARVEDILOL 6.25 MG/1
6.25 TABLET ORAL 2 TIMES DAILY WITH MEALS
Qty: 180 TABLET | Refills: 0 | Status: SHIPPED | OUTPATIENT
Start: 2019-03-19 | End: 2019-06-09

## 2019-03-23 RX ORDER — ATORVASTATIN CALCIUM 10 MG/1
TABLET, FILM COATED ORAL
Qty: 90 TABLET | Refills: 1 | Status: SHIPPED | OUTPATIENT
Start: 2019-03-23 | End: 2019-09-20

## 2019-03-23 NOTE — TELEPHONE ENCOUNTER
Cholesterol Medications  Protocol Criteria:  · Appointment scheduled in the past 12 months or in the next 3 months  · ALT & LDL on file in the past 12 months  · ALT result < 80  · LDL result <130   Recent Outpatient Visits            3 days ago Essential h

## 2019-03-24 PROBLEM — N39.46 MIXED STRESS AND URGE URINARY INCONTINENCE: Status: ACTIVE | Noted: 2019-03-24

## 2019-03-24 PROBLEM — Z85.42 HISTORY OF UTERINE CANCER: Status: ACTIVE | Noted: 2019-03-24

## 2019-03-24 NOTE — PROGRESS NOTES
HPI:   Paula Jean Baptiste is a 80year old female who presents for a MA (Medicare Advantage) [facility] (Once per calendar year).   Patient reports that overall she has been feeling fair, states that the counseling helped a lot she has decreased amount of anxi screening   Jessica Calhoun was screened for Alcohol abuse and had a score of 0 so is at low risk.     Patient Care Team: Patient Care Team:  Nory Shell MD as PCP - General (Internal Medicine)    Patient Active Problem List:     Anxiety     Physical exa (MEDICAL COMPRESSION STOCKINGS) Does not apply Misc 2 each by Does not apply route daily.  Compression 20-30 mmHg knee high   ENALAPRIL MALEATE 10 MG Oral Tab TAKE 1 TABLET BY MOUTH TWICE A DAY   CARVEDILOL 3.125 MG Oral Tab TAKE 1 TABLET BY MOUTH 2 TIMES D Negative for dysuria and hematuria  Hema/Lymph:  Negative for easy bleeding and easy bruising  Integumentary:  Negative for pruritus and rash  Musculoskeletal: Positive for bone/joint symptoms  Neurological:  Negative for gait disturbance, paresthesias  Ps cyanosis, or clubbing  Neurological: exam appropriate for age reflexes and motor skills appropriate for age  Vaccination History     Immunization History   Administered Date(s) Administered   • FLU VACC High Dose 72 YRS & Older PRSV Free (42596) 09/05/2017 XL, 150 MG Oral Tablet 24 Hr; Take 1 tablet (150 mg total) by mouth daily.  -     HYDROcodone-acetaminophen 5-325 MG Oral Tab; Take 1 tablet by mouth every 6 (six) hours as needed for Pain. -     carvedilol 6.25 MG Oral Tab;  Take 1 tablet (6.25 mg total) results found for: FOB No flowsheet data found. Glaucoma Screening      Ophthalmology Visit Annually: Diabetics, FHx Glaucoma, AA>50, > 65 No flowsheet data found.     Bone Density Screening      Dexascan Every two years Last Dexa Scan:   XR DEXA Medications (ACE/ARB, digoxin diuretics, anticonvulsants.)    Potassium  Annually Potassium (mmol/L)   Date Value   03/19/2019 3.9     POTASSIUM (P) (mmol/L)   Date Value   02/16/2016 4.0    No flowsheet data found.     Creatinine  Annually Creatinine (mg

## 2019-03-28 ENCOUNTER — HOSPITAL ENCOUNTER (OUTPATIENT)
Dept: MAMMOGRAPHY | Age: 69
Discharge: HOME | End: 2019-03-28
Attending: INTERNAL MEDICINE
Payer: MEDICARE

## 2019-03-28 DIAGNOSIS — Z12.31 BREAST CANCER SCREENING BY MAMMOGRAM: ICD-10-CM

## 2019-03-28 PROCEDURE — 77067 SCR MAMMO BI INCL CAD: CPT | Performed by: INTERNAL MEDICINE

## 2019-03-28 PROCEDURE — 77063 BREAST TOMOSYNTHESIS BI: CPT | Performed by: INTERNAL MEDICINE

## 2019-05-07 RX ORDER — CARVEDILOL 3.12 MG/1
TABLET ORAL
Qty: 180 TABLET | Refills: 1 | Status: SHIPPED | OUTPATIENT
Start: 2019-05-07 | End: 2019-11-08

## 2019-05-09 ENCOUNTER — HOSPITAL ENCOUNTER (OUTPATIENT)
Dept: ULTRASOUND IMAGING | Facility: HOSPITAL | Age: 69
Discharge: HOME | End: 2019-05-09
Attending: INTERNAL MEDICINE
Payer: MEDICARE

## 2019-05-09 ENCOUNTER — HOSPITAL ENCOUNTER (OUTPATIENT)
Dept: MAMMOGRAPHY | Facility: HOSPITAL | Age: 69
Discharge: HOME | End: 2019-05-09
Attending: INTERNAL MEDICINE
Payer: MEDICARE

## 2019-05-09 DIAGNOSIS — R92.8 ABNORMAL MAMMOGRAM: ICD-10-CM

## 2019-05-09 PROCEDURE — 77061 BREAST TOMOSYNTHESIS UNI: CPT | Performed by: INTERNAL MEDICINE

## 2019-05-09 PROCEDURE — 76642 ULTRASOUND BREAST LIMITED: CPT | Performed by: INTERNAL MEDICINE

## 2019-05-09 PROCEDURE — 77065 DX MAMMO INCL CAD UNI: CPT | Performed by: INTERNAL MEDICINE

## 2019-05-09 NOTE — PROGRESS NOTES
Please call patient with mammogram breast results.     Dr. Staley Fees covering for Dr. Tyson Mosher while she is away from office    Mammogram test results    Mammogram breast is normal /stable, recommend routine f/u mammogram in 1 year, continue monthly self br

## 2019-06-11 ENCOUNTER — TELEPHONE (OUTPATIENT)
Dept: OTHER | Age: 69
End: 2019-06-11

## 2019-06-11 RX ORDER — BUPROPION HYDROCHLORIDE 150 MG/1
TABLET ORAL
Qty: 90 TABLET | Refills: 1 | Status: SHIPPED | OUTPATIENT
Start: 2019-06-11 | End: 2019-11-08

## 2019-06-11 RX ORDER — CARVEDILOL 6.25 MG/1
TABLET ORAL
Qty: 180 TABLET | Refills: 0 | Status: SHIPPED | OUTPATIENT
Start: 2019-06-11 | End: 2019-11-08

## 2019-06-11 NOTE — TELEPHONE ENCOUNTER
Pt returned call. Pt states Dr. Emily Gonzalez increased her carvedilol to 6.255 at her annual physical. Please see below. Pt states the rx sent  In May was a mistake and was told to the pharmacist. rx sent to the pharmacy. Informed pt she due for a f/u appt.  Pt s

## 2019-09-20 NOTE — TELEPHONE ENCOUNTER
Review pended refill request as it does not fall under a protocol.   Requested Prescriptions     Pending Prescriptions Disp Refills   • ATORVASTATIN 10 MG Oral Tab [Pharmacy Med Name: ATORVASTATIN 10 MG TABLET] 90 tablet 1     Sig: TAKE 1 TABLET BY MOUTH EV

## 2019-09-21 RX ORDER — ATORVASTATIN CALCIUM 10 MG/1
TABLET, FILM COATED ORAL
Qty: 90 TABLET | Refills: 0 | Status: SHIPPED | OUTPATIENT
Start: 2019-09-21 | End: 2019-12-14

## 2019-09-21 NOTE — TELEPHONE ENCOUNTER
Please call patient I refilled cholesterol medication for 90 days she is due for 6 months follow-up visit, she may fast will recheck labs

## 2019-09-30 NOTE — TELEPHONE ENCOUNTER
A no response letter was sent with the message that an appointment is needed via [employer] and in the mail.

## 2019-10-21 ENCOUNTER — TELEPHONE (OUTPATIENT)
Dept: INTERNAL MEDICINE CLINIC | Facility: CLINIC | Age: 69
End: 2019-10-21

## 2019-10-21 NOTE — TELEPHONE ENCOUNTER
Patient sis Collins Villalba called and states she have a appointment on November 8, 2019 and she will bring her to the appointment because her sister Abdias Kaur is not telling Dr. Merry Lemus everything that is going on and they are concern about her and the medication she taking     Please advise     You don't have to call just states she is giving you a head up that they coming to the appointment.     Collins Villalba number if you want to call [phone]

## 2019-11-08 ENCOUNTER — LAB ENCOUNTER (OUTPATIENT)
Dept: LAB | Age: 69
End: 2019-11-08
Attending: INTERNAL MEDICINE
Payer: MEDICARE

## 2019-11-08 ENCOUNTER — TELEPHONE (OUTPATIENT)
Dept: OTHER | Age: 69
End: 2019-11-08

## 2019-11-08 ENCOUNTER — OFFICE VISIT (OUTPATIENT)
Dept: INTERNAL MEDICINE CLINIC | Facility: CLINIC | Age: 69
End: 2019-11-08
Payer: MEDICARE

## 2019-11-08 VITALS
BODY MASS INDEX: 34.55 KG/M2 | DIASTOLIC BLOOD PRESSURE: 90 MMHG | HEIGHT: 63 IN | SYSTOLIC BLOOD PRESSURE: 159 MMHG | HEART RATE: 78 BPM | WEIGHT: 195 LBS | RESPIRATION RATE: 18 BRPM

## 2019-11-08 DIAGNOSIS — R41.82 ALTERED MENTAL STATUS, UNSPECIFIED ALTERED MENTAL STATUS TYPE: Primary | ICD-10-CM

## 2019-11-08 DIAGNOSIS — R41.3 MEMORY CHANGE: ICD-10-CM

## 2019-11-08 DIAGNOSIS — R32 URINARY INCONTINENCE, UNSPECIFIED TYPE: ICD-10-CM

## 2019-11-08 DIAGNOSIS — R73.9 HYPERGLYCEMIA: ICD-10-CM

## 2019-11-08 DIAGNOSIS — E55.9 VITAMIN D DEFICIENCY: ICD-10-CM

## 2019-11-08 DIAGNOSIS — E53.8 VITAMIN B12 DEFICIENCY: ICD-10-CM

## 2019-11-08 DIAGNOSIS — R41.82 ALTERED MENTAL STATUS, UNSPECIFIED ALTERED MENTAL STATUS TYPE: ICD-10-CM

## 2019-11-08 PROCEDURE — 99214 OFFICE O/P EST MOD 30 MIN: CPT | Performed by: INTERNAL MEDICINE

## 2019-11-08 PROCEDURE — 80053 COMPREHEN METABOLIC PANEL: CPT

## 2019-11-08 PROCEDURE — 85025 COMPLETE CBC W/AUTO DIFF WBC: CPT

## 2019-11-08 PROCEDURE — 36415 COLL VENOUS BLD VENIPUNCTURE: CPT

## 2019-11-08 PROCEDURE — 82607 VITAMIN B-12: CPT

## 2019-11-08 PROCEDURE — 85652 RBC SED RATE AUTOMATED: CPT

## 2019-11-08 PROCEDURE — 86140 C-REACTIVE PROTEIN: CPT

## 2019-11-08 PROCEDURE — 86780 TREPONEMA PALLIDUM: CPT

## 2019-11-08 PROCEDURE — 83036 HEMOGLOBIN GLYCOSYLATED A1C: CPT

## 2019-11-08 PROCEDURE — 84443 ASSAY THYROID STIM HORMONE: CPT

## 2019-11-08 PROCEDURE — 80061 LIPID PANEL: CPT

## 2019-11-08 PROCEDURE — 81001 URINALYSIS AUTO W/SCOPE: CPT

## 2019-11-08 PROCEDURE — 82306 VITAMIN D 25 HYDROXY: CPT

## 2019-11-08 RX ORDER — CARVEDILOL 6.25 MG/1
TABLET ORAL
Qty: 180 TABLET | Refills: 0 | Status: SHIPPED | OUTPATIENT
Start: 2019-11-08 | End: 2020-01-31

## 2019-11-08 RX ORDER — HYDROCODONE BITARTRATE AND ACETAMINOPHEN 5; 325 MG/1; MG/1
1 TABLET ORAL EVERY 6 HOURS PRN
Qty: 60 TABLET | Refills: 0 | Status: SHIPPED | OUTPATIENT
Start: 2019-11-08 | End: 2020-06-10

## 2019-11-08 RX ORDER — CARVEDILOL 3.12 MG/1
3.12 TABLET ORAL 2 TIMES DAILY WITH MEALS
Qty: 180 TABLET | Refills: 1 | Status: SHIPPED | OUTPATIENT
Start: 2019-11-08 | End: 2020-06-23

## 2019-11-08 RX ORDER — BUPROPION HYDROCHLORIDE 300 MG/1
300 TABLET ORAL DAILY
Qty: 90 TABLET | Refills: 0 | Status: SHIPPED | OUTPATIENT
Start: 2019-11-08 | End: 2020-02-04

## 2019-11-08 NOTE — TELEPHONE ENCOUNTER
Patient states has appointment for MRI 11/20/19 at 12:30p. Patient sister will be driving her. Patient states she is claustrophobic and will need something for being in MRI.   Please send prescription to [facility] in [facility]   Thank you

## 2019-11-12 ENCOUNTER — TELEPHONE (OUTPATIENT)
Dept: OTHER | Age: 69
End: 2019-11-12

## 2019-11-12 ENCOUNTER — TELEPHONE (OUTPATIENT)
Dept: INTERNAL MEDICINE CLINIC | Facility: CLINIC | Age: 69
End: 2019-11-12

## 2019-11-12 DIAGNOSIS — R41.82 ALTERED MENTAL STATUS, UNSPECIFIED ALTERED MENTAL STATUS TYPE: Primary | ICD-10-CM

## 2019-11-12 DIAGNOSIS — Z13.9 SCREENING FOR CONDITION: Primary | ICD-10-CM

## 2019-11-12 RX ORDER — LORAZEPAM 0.5 MG/1
TABLET ORAL
Qty: 2 TABLET | Refills: 0 | Status: SHIPPED | OUTPATIENT
Start: 2019-11-12 | End: 2021-06-05 | Stop reason: ALTCHOICE

## 2019-11-12 NOTE — TELEPHONE ENCOUNTER
Pt calling to states she has MRI scheduled 11/20/19 and \"I need to be sedated, I took valium before and it doesn't work for [employer]-System"    Please advise

## 2019-11-12 NOTE — TELEPHONE ENCOUNTER
Spoke to pt  She cannot  Do Mri , we  desided to do CT  Brain , she  Will take  Lorazepam  Before  brain  Scan, I placed order  advisied that  Managed care will  Let her know  When test approved  So she can schedule test, advised to cancel MRI  Brain. Phone

## 2019-11-18 ENCOUNTER — TELEPHONE (OUTPATIENT)
Dept: FAMILY MEDICINE CLINIC | Facility: CLINIC | Age: 69
End: 2019-11-18

## 2019-11-18 RX ORDER — ERGOCALCIFEROL 1.25 MG/1
50000 CAPSULE ORAL WEEKLY
Qty: 12 CAPSULE | Refills: 1 | Status: SHIPPED | OUTPATIENT
Start: 2019-11-18 | End: 2020-04-28

## 2019-11-18 NOTE — TELEPHONE ENCOUNTER
Written by Dale Ingram MD on 11/11/2019  7:01 PM   Normal blood count, normal kidney liver function test, diabetic test hemoglobin A1c 5.9 it is prediabetic range, you should decrease amount of carbohydrate you eat, we can send you to see diabetic educa

## 2019-11-18 NOTE — TELEPHONE ENCOUNTER
Patient calling and states that Dr. Estephania Alvarez suppose to wrote her a prescription for Vitamin D but she had yet to receive it she had a appointment with Dr. Estephania Alvarez on November 8, 2019      Please advise   #[phone]

## 2019-11-20 ENCOUNTER — TELEPHONE (OUTPATIENT)
Dept: INTERNAL MEDICINE CLINIC | Facility: CLINIC | Age: 69
End: 2019-11-20

## 2019-11-20 ENCOUNTER — HOSPITAL ENCOUNTER (OUTPATIENT)
Dept: CT IMAGING | Age: 69
Discharge: HOME | End: 2019-11-20
Attending: INTERNAL MEDICINE
Payer: MEDICARE

## 2019-11-20 DIAGNOSIS — R41.82 ALTERED MENTAL STATUS, UNSPECIFIED ALTERED MENTAL STATUS TYPE: ICD-10-CM

## 2019-11-20 PROCEDURE — 70470 CT HEAD/BRAIN W/O & W/DYE: CPT | Performed by: INTERNAL MEDICINE

## 2019-11-20 NOTE — TELEPHONE ENCOUNTER
Pt came to [facility]  states she had CT done on 11/20/19 and to please call when results are available.

## 2019-11-27 NOTE — TELEPHONE ENCOUNTER
6 days ago, reporting that CT scan showed changes consistent with age, signs of old stroke, she will relate this information to her sister, patient has appointment with psychiatristSpoke to sister

## 2019-12-10 ENCOUNTER — TELEPHONE (OUTPATIENT)
Dept: INTERNAL MEDICINE CLINIC | Facility: CLINIC | Age: 69
End: 2019-12-10

## 2019-12-10 RX ORDER — BUPROPION HYDROCHLORIDE 150 MG/1
TABLET ORAL
Qty: 90 TABLET | Refills: 1 | OUTPATIENT
Start: 2019-12-10

## 2019-12-10 NOTE — TELEPHONE ENCOUNTER
Spoke with sister Sulemanandre River ([facility]), she believes this was a dose change at [address] 11/8/19. ( documented increase to 300 mg at that [address] will verify with patient and ask patient to call us back.

## 2019-12-10 NOTE — TELEPHONE ENCOUNTER
Pt called and verified is on 300 mg tab of bupropion as per 11/8/19 scipt shown on active med list. Pt states has already informed the pharmacy they need to remove old 150 mg dose. States Rx request can be refused.

## 2019-12-10 NOTE — TELEPHONE ENCOUNTER
Dr Anna Hannon, please note. Sister Rosa Melvin ([facility]) wanted you to know that patient went to first mental health appointment last week Thursday and will return to second appointment this Thursday. She said they confirmed PTSD, but would also address medications.

## 2019-12-10 NOTE — TELEPHONE ENCOUNTER
RN's please call pt, current med list says pt is taking 300 mg of Buproprion XL this script is for 150mg dose? Verify which she is taking.

## 2019-12-15 RX ORDER — ATORVASTATIN CALCIUM 10 MG/1
TABLET, FILM COATED ORAL
Qty: 90 TABLET | Refills: 1 | Status: SHIPPED | OUTPATIENT
Start: 2019-12-15 | End: 2020-06-08

## 2019-12-16 NOTE — TELEPHONE ENCOUNTER
Review pended refill request as it does not fall under a protocol.   Requested Prescriptions     Pending Prescriptions Disp Refills   • ATORVASTATIN 10 MG Oral Tab [Pharmacy Med Name: ATORVASTATIN 10 MG TABLET] 90 tablet 0     Sig: TAKE 1 TABLET BY MOUTH EV

## 2019-12-27 NOTE — TELEPHONE ENCOUNTER
Please follow-up with patient's her sister, patient had high blood pressure during the last visit, needs follow-up for blood pressure medication adjustment, we canceled appointment in December, if she comes in January she can come for a annual Medicare vis

## 2019-12-30 NOTE — TELEPHONE ENCOUNTER
Patient returned call.  Appointment scheduled with Dr. Filemon Owens for January 21, 2010 for Medicare Annual.

## 2020-01-23 ENCOUNTER — TELEPHONE (OUTPATIENT)
Dept: INTERNAL MEDICINE CLINIC | Facility: CLINIC | Age: 70
End: 2020-01-23

## 2020-01-31 RX ORDER — CARVEDILOL 6.25 MG/1
TABLET ORAL
Qty: 180 TABLET | Refills: 1 | Status: SHIPPED | OUTPATIENT
Start: 2020-01-31 | End: 2021-06-05 | Stop reason: ALTCHOICE

## 2020-01-31 RX ORDER — OXYBUTYNIN CHLORIDE 5 MG/1
TABLET, EXTENDED RELEASE ORAL
Qty: 90 TABLET | Refills: 1 | Status: SHIPPED | OUTPATIENT
Start: 2020-01-31 | End: 2020-07-27

## 2020-01-31 NOTE — TELEPHONE ENCOUNTER
Refill passed per [facility], [facility] protocol.   Hypertensive Medications  Protocol Criteria:  · Appointment scheduled in the past 6 months or in the next 3 months  · BMP or CMP in the past 12 months  · Creatinine result < 2  Recent Outpatient Visits Office Visit    12 months ago Female stress incontinence    [facility]    Office Visit    1 year ago Essential hypertension    [facility], [facility], [address] [facility], Bal Oliva MD    Office Visit

## 2020-02-04 RX ORDER — BUPROPION HYDROCHLORIDE 300 MG/1
TABLET ORAL
Qty: 90 TABLET | Refills: 1 | Status: SHIPPED | OUTPATIENT
Start: 2020-02-04 | End: 2021-08-25

## 2020-04-28 RX ORDER — ERGOCALCIFEROL 1.25 MG/1
CAPSULE ORAL
Qty: 12 CAPSULE | Refills: 1 | Status: SHIPPED | OUTPATIENT
Start: 2020-04-28 | End: 2020-10-14

## 2020-05-04 RX ORDER — ENALAPRIL MALEATE 10 MG/1
TABLET ORAL
Qty: 180 TABLET | Refills: 0 | Status: SHIPPED | OUTPATIENT
Start: 2020-05-04 | End: 2021-06-05 | Stop reason: ALTCHOICE

## 2020-05-28 ENCOUNTER — TELEPHONE (OUTPATIENT)
Dept: INTERNAL MEDICINE CLINIC | Facility: CLINIC | Age: 70
End: 2020-05-28

## 2020-05-28 NOTE — TELEPHONE ENCOUNTER
Spoke to patient, that I cannot feel forms until I see patient, her last visit was November 2019, she agreed to make appointment

## 2020-05-28 NOTE — TELEPHONE ENCOUNTER
Patient called and advised her Dentist has not yet received the Pre-Procedure Forms (see encounter dated 5/12).      She needs this sent to the dentist ASA, as her procedure is this upcoming Sunday 5/31      Please Advise

## 2020-06-01 ENCOUNTER — TELEPHONE (OUTPATIENT)
Dept: INTERNAL MEDICINE CLINIC | Facility: CLINIC | Age: 70
End: 2020-06-01

## 2020-06-08 RX ORDER — ATORVASTATIN CALCIUM 10 MG/1
TABLET, FILM COATED ORAL
Qty: 90 TABLET | Refills: 1 | Status: SHIPPED | OUTPATIENT
Start: 2020-06-08 | End: 2020-12-20

## 2020-06-10 ENCOUNTER — LAB ENCOUNTER (OUTPATIENT)
Dept: LAB | Age: 70
End: 2020-06-10
Attending: INTERNAL MEDICINE
Payer: MEDICARE

## 2020-06-10 ENCOUNTER — OFFICE VISIT (OUTPATIENT)
Dept: INTERNAL MEDICINE CLINIC | Facility: CLINIC | Age: 70
End: 2020-06-10
Payer: MEDICARE

## 2020-06-10 VITALS
HEIGHT: 63 IN | TEMPERATURE: 98 F | SYSTOLIC BLOOD PRESSURE: 160 MMHG | HEART RATE: 72 BPM | WEIGHT: 197.5 LBS | DIASTOLIC BLOOD PRESSURE: 98 MMHG | BODY MASS INDEX: 34.99 KG/M2

## 2020-06-10 DIAGNOSIS — I10 ESSENTIAL HYPERTENSION: Primary | ICD-10-CM

## 2020-06-10 DIAGNOSIS — R73.03 PREDIABETES: ICD-10-CM

## 2020-06-10 DIAGNOSIS — E78.00 PURE HYPERCHOLESTEROLEMIA: ICD-10-CM

## 2020-06-10 DIAGNOSIS — M17.12 ARTHRITIS OF LEFT KNEE: ICD-10-CM

## 2020-06-10 DIAGNOSIS — I10 ESSENTIAL HYPERTENSION: ICD-10-CM

## 2020-06-10 PROCEDURE — 82550 ASSAY OF CK (CPK): CPT

## 2020-06-10 PROCEDURE — 83036 HEMOGLOBIN GLYCOSYLATED A1C: CPT

## 2020-06-10 PROCEDURE — 85025 COMPLETE CBC W/AUTO DIFF WBC: CPT

## 2020-06-10 PROCEDURE — 80053 COMPREHEN METABOLIC PANEL: CPT

## 2020-06-10 PROCEDURE — 80061 LIPID PANEL: CPT

## 2020-06-10 PROCEDURE — 36415 COLL VENOUS BLD VENIPUNCTURE: CPT

## 2020-06-10 PROCEDURE — 99214 OFFICE O/P EST MOD 30 MIN: CPT | Performed by: INTERNAL MEDICINE

## 2020-06-10 RX ORDER — PRAZOSIN HYDROCHLORIDE 1 MG/1
1 CAPSULE ORAL NIGHTLY
COMMUNITY

## 2020-06-10 RX ORDER — LAMOTRIGINE 25 MG/1
50 TABLET ORAL DAILY
COMMUNITY
End: 2021-08-25

## 2020-06-10 RX ORDER — HYDROCODONE BITARTRATE AND ACETAMINOPHEN 5; 325 MG/1; MG/1
1 TABLET ORAL EVERY 8 HOURS PRN
Qty: 60 TABLET | Refills: 0 | Status: SHIPPED | OUTPATIENT
Start: 2020-06-10 | End: 2020-10-05

## 2020-06-14 NOTE — PROGRESS NOTES
HPI:    Patient ID: Gloria Crenshaw is a 77year old female. Presents for follow-up on hypertension, hyperlipidemia.   HPI  Patient reports that overall she has been feeling well since last visit about 6 months ago depressive symptoms improved after she wo 180 tablet 0   • ERGOCALCIFEROL 1.25 MG (10589 UT) Oral Cap TAKE 1 CAPSULE BY MOUTH ONE TIME PER WEEK 12 capsule 1   • BUPROPION HCL ER, XL, 300 MG Oral Tablet 24 Hr TAKE 1 TABLET BY MOUTH EVERY DAY 90 tablet 1   • CARVEDILOL 6.25 MG Oral Tab TAKE 1 TABLET Judgment normal.              ASSESSMENT/PLAN:   Essential hypertension  (primary encounter diagnosis) uncontrolled, patient will increase enalapril take 50 mg twice a day, increase carvedilol take 12.5 mg twice a day, follow-up in 2 weeks, will get labs C

## 2020-06-23 ENCOUNTER — OFFICE VISIT (OUTPATIENT)
Dept: INTERNAL MEDICINE CLINIC | Facility: CLINIC | Age: 70
End: 2020-06-23
Payer: MEDICARE

## 2020-06-23 VITALS
BODY MASS INDEX: 35 KG/M2 | WEIGHT: 196 LBS | HEART RATE: 76 BPM | DIASTOLIC BLOOD PRESSURE: 86 MMHG | SYSTOLIC BLOOD PRESSURE: 140 MMHG | RESPIRATION RATE: 18 BRPM

## 2020-06-23 DIAGNOSIS — I10 ESSENTIAL HYPERTENSION: Primary | ICD-10-CM

## 2020-06-23 PROCEDURE — 99214 OFFICE O/P EST MOD 30 MIN: CPT | Performed by: INTERNAL MEDICINE

## 2020-06-23 RX ORDER — CARVEDILOL 12.5 MG/1
12.5 TABLET ORAL 2 TIMES DAILY WITH MEALS
Qty: 180 TABLET | Refills: 1 | Status: SHIPPED | OUTPATIENT
Start: 2020-06-23 | End: 2020-12-18

## 2020-06-23 RX ORDER — ENALAPRIL MALEATE 20 MG/1
20 TABLET ORAL 2 TIMES DAILY
Qty: 180 TABLET | Refills: 1 | Status: SHIPPED | OUTPATIENT
Start: 2020-06-23 | End: 2020-12-18

## 2020-06-27 NOTE — PROGRESS NOTES
HPI:    Patient ID: Jarett Aguilar is a 77year old female.   Presents for follow-up on hypertension  HPI  Patient was seen in the office 2 weeks ago found to have elevated blood pressure, she is scheduled for dental procedure crown removal may need anesth 6.25 MG Oral Tab TAKE 1 TABLET BY MOUTH TWICE A DAY WITH FOOD,TAKE TOGETHER WITH CARVEDILOL 3.125  tablet 1   • OXYBUTYNIN CHLORIDE ER 5 MG Oral Tablet 24 Hr TAKE 1 TABLET BY MOUTH EVERY DAY 90 tablet 1   • LORazepam 0.5 MG Oral Tab Take  1 tab po  2 MG Oral Tab 180 tablet 1     Sig: Take 1 tablet (12.5 mg total) by mouth 2 (two) times daily with meals.        Imaging & Referrals:  None         FR#[id number]

## 2020-07-27 RX ORDER — OXYBUTYNIN CHLORIDE 5 MG/1
TABLET, EXTENDED RELEASE ORAL
Qty: 90 TABLET | Refills: 1 | Status: SHIPPED | OUTPATIENT
Start: 2020-07-27 | End: 2021-06-05 | Stop reason: ALTCHOICE

## 2020-07-30 ENCOUNTER — TELEPHONE (OUTPATIENT)
Dept: CASE MANAGEMENT | Age: 70
End: 2020-07-30

## 2020-07-30 NOTE — TELEPHONE ENCOUNTER
Ronni Dave is a 40year Spoke with pt. Sister. STates to call pt. Not available to talk at this time at [phone] [phone]. Called pt. No answer, no voicemail. kg/m².   GENERAL: well developed, well nourished,in no apparent distress  SKIN: no rashes,no suspicious lesions  HEENT: atraumatic, normocephalic, O/P normal  EYES:PERRLA, EOMI  NECK: supple,no adenopathy   LUNGS: clear to auscultation  CARDIO: RRR without

## 2020-08-05 NOTE — TELEPHONE ENCOUNTER
Please check with [t is  She is  Getting  Bupropion form psychaitrist or she  wants  Me  To refill medications

## 2020-08-10 ENCOUNTER — TELEPHONE (OUTPATIENT)
Dept: CASE MANAGEMENT | Age: 70
End: 2020-08-10

## 2020-08-21 RX ORDER — BUPROPION HYDROCHLORIDE 300 MG/1
300 TABLET ORAL DAILY
Qty: 90 TABLET | Refills: 1 | OUTPATIENT
Start: 2020-08-21

## 2020-10-05 ENCOUNTER — OFFICE VISIT (OUTPATIENT)
Dept: INTERNAL MEDICINE CLINIC | Facility: CLINIC | Age: 70
End: 2020-10-05
Payer: MEDICARE

## 2020-10-05 VITALS
SYSTOLIC BLOOD PRESSURE: 159 MMHG | HEIGHT: 63 IN | DIASTOLIC BLOOD PRESSURE: 88 MMHG | RESPIRATION RATE: 18 BRPM | WEIGHT: 201 LBS | BODY MASS INDEX: 35.61 KG/M2 | HEART RATE: 65 BPM

## 2020-10-05 DIAGNOSIS — F32.4 MAJOR DEPRESSIVE DISORDER WITH SINGLE EPISODE, IN PARTIAL REMISSION (HCC): ICD-10-CM

## 2020-10-05 DIAGNOSIS — R92.2 DENSE BREAST TISSUE: ICD-10-CM

## 2020-10-05 DIAGNOSIS — I10 ESSENTIAL HYPERTENSION: ICD-10-CM

## 2020-10-05 DIAGNOSIS — R73.03 PREDIABETES: ICD-10-CM

## 2020-10-05 DIAGNOSIS — N39.46 MIXED STRESS AND URGE URINARY INCONTINENCE: ICD-10-CM

## 2020-10-05 DIAGNOSIS — E66.01 SEVERE OBESITY (BMI 35.0-39.9) WITH COMORBIDITY (HCC): ICD-10-CM

## 2020-10-05 DIAGNOSIS — Z85.42 HISTORY OF UTERINE CANCER: ICD-10-CM

## 2020-10-05 DIAGNOSIS — E78.00 PURE HYPERCHOLESTEROLEMIA: ICD-10-CM

## 2020-10-05 DIAGNOSIS — M17.12 ARTHRITIS OF LEFT KNEE: ICD-10-CM

## 2020-10-05 DIAGNOSIS — I70.0 ATHEROSCLEROSIS OF AORTA (HCC): ICD-10-CM

## 2020-10-05 DIAGNOSIS — Z91.81 AT RISK FOR FALLS: ICD-10-CM

## 2020-10-05 DIAGNOSIS — Z12.31 BREAST CANCER SCREENING BY MAMMOGRAM: ICD-10-CM

## 2020-10-05 DIAGNOSIS — Z00.00 PHYSICAL EXAM, ANNUAL: Primary | ICD-10-CM

## 2020-10-05 PROCEDURE — 99397 PER PM REEVAL EST PAT 65+ YR: CPT | Performed by: INTERNAL MEDICINE

## 2020-10-05 PROCEDURE — 3077F SYST BP >= 140 MM HG: CPT | Performed by: INTERNAL MEDICINE

## 2020-10-05 PROCEDURE — 96160 PT-FOCUSED HLTH RISK ASSMT: CPT | Performed by: INTERNAL MEDICINE

## 2020-10-05 PROCEDURE — 3008F BODY MASS INDEX DOCD: CPT | Performed by: INTERNAL MEDICINE

## 2020-10-05 PROCEDURE — G0439 PPPS, SUBSEQ VISIT: HCPCS | Performed by: INTERNAL MEDICINE

## 2020-10-05 PROCEDURE — 3079F DIAST BP 80-89 MM HG: CPT | Performed by: INTERNAL MEDICINE

## 2020-10-05 RX ORDER — OXYBUTYNIN CHLORIDE 10 MG/1
10 TABLET, EXTENDED RELEASE ORAL DAILY
Qty: 90 TABLET | Refills: 0 | Status: SHIPPED | OUTPATIENT
Start: 2020-10-05 | End: 2020-12-28

## 2020-10-05 RX ORDER — HYDROCODONE BITARTRATE AND ACETAMINOPHEN 5; 325 MG/1; MG/1
1 TABLET ORAL EVERY 8 HOURS PRN
Qty: 60 TABLET | Refills: 0 | Status: SHIPPED | OUTPATIENT
Start: 2020-10-05 | End: 2021-01-02

## 2020-10-11 NOTE — PROGRESS NOTES
HPI:   Luis Phan is a 72year old female who presents for a MA (Medicare Advantage) Supervisit (Once per calendar year).     She reports that overall she has been doing fair, better than several months ago, she sees psychiatrist and depressive symptom on screening of functional status.    Memory Problems?: Yes       Depression Screening (PHQ-2/PHQ-9): Over the LAST 2 WEEKS   Little interest or pleasure in doing things (over the last two weeks)?: Several days  Feeling down, depressed, or hopeless (over th MG Oral Tab, Take 1 tablet by mouth every 8 (eight) hours as needed for Pain. •  Oxybutynin Chloride ER 10 MG Oral Tablet 24 Hr, Take 1 tablet (10 mg total) by mouth daily.       •  hyaluronic acid (SYNVISC-ONE) injection       MEDICAL INFORMATION:   She kg)   BMI 35.61 kg/m²  Estimated body mass index is 35.61 kg/m² as calculated from the following:    Height as of this encounter: 5' 3\" (1.6 m). Weight as of this encounter: 201 lb (91.2 kg).     Medicare Hearing Assessment  (Required for AWV/SWV)    He normal. No scleral icterus. Neck: Normal range of motion. Neck supple. No JVD present. No thyromegaly present. Cardiovascular: Normal rate, regular rhythm and normal heart sounds. No murmur heard. Edema not present.   Pulmonary/Chest: Effort normal clinically stable no treatment required    At risk for falls encourage patient to use cane when she feels unsteady    Arthritis of left knee advanced, in need of arthroplasty, patient remain, uses Ltanya Abbe as needed for pain    Prediabetes stressed importance maintain positive mental well-being?: Visiting Family      This section provided for quick review of chart, separate sheet to patient  [address] Internal Lab or Procedure External Lab or Procedure   Diabetes Screening your 66th birthday    Pneumococcal 23 (Pneumovax)  Covered Once after 65 01/25/2019 Please get once after your 65th birthday    Hepatitis B for Moderate/High Risk No vaccine history found Medium/high risk factors:   End-stage renal disease   Hemophiliacs w

## 2020-10-14 RX ORDER — ERGOCALCIFEROL 1.25 MG/1
CAPSULE ORAL
Qty: 12 CAPSULE | Refills: 1 | Status: SHIPPED | OUTPATIENT
Start: 2020-10-14 | End: 2021-04-06

## 2020-12-18 RX ORDER — ENALAPRIL MALEATE 20 MG/1
TABLET ORAL
Qty: 180 TABLET | Refills: 1 | Status: SHIPPED | OUTPATIENT
Start: 2020-12-18 | End: 2021-08-25

## 2020-12-18 RX ORDER — CARVEDILOL 12.5 MG/1
TABLET ORAL
Qty: 180 TABLET | Refills: 1 | Status: SHIPPED | OUTPATIENT
Start: 2020-12-18 | End: 2021-06-17

## 2020-12-20 RX ORDER — ATORVASTATIN CALCIUM 10 MG/1
TABLET, FILM COATED ORAL
Qty: 90 TABLET | Refills: 1 | Status: SHIPPED | OUTPATIENT
Start: 2020-12-20 | End: 2021-06-17

## 2020-12-28 RX ORDER — OXYBUTYNIN CHLORIDE 10 MG/1
10 TABLET, EXTENDED RELEASE ORAL DAILY
Qty: 90 TABLET | Refills: 1 | Status: SHIPPED | OUTPATIENT
Start: 2020-12-28 | End: 2021-06-24

## 2021-01-04 RX ORDER — HYDROCODONE BITARTRATE AND ACETAMINOPHEN 5; 325 MG/1; MG/1
1 TABLET ORAL EVERY 8 HOURS PRN
Qty: 60 TABLET | Refills: 0 | Status: SHIPPED | OUTPATIENT
Start: 2021-01-04 | End: 2021-06-05

## 2021-01-13 ENCOUNTER — LAB ENCOUNTER (OUTPATIENT)
Dept: LAB | Age: 71
End: 2021-01-13
Attending: INTERNAL MEDICINE
Payer: MEDICARE

## 2021-01-13 ENCOUNTER — HOSPITAL ENCOUNTER (OUTPATIENT)
Dept: MAMMOGRAPHY | Age: 71
Discharge: HOME | End: 2021-01-13
Attending: INTERNAL MEDICINE
Payer: MEDICARE

## 2021-01-13 DIAGNOSIS — Z12.31 BREAST CANCER SCREENING BY MAMMOGRAM: ICD-10-CM

## 2021-01-13 DIAGNOSIS — E78.00 PURE HYPERCHOLESTEROLEMIA: ICD-10-CM

## 2021-01-13 DIAGNOSIS — R92.2 DENSE BREAST TISSUE: ICD-10-CM

## 2021-01-13 DIAGNOSIS — R73.03 PREDIABETES: ICD-10-CM

## 2021-01-13 DIAGNOSIS — I10 ESSENTIAL HYPERTENSION: ICD-10-CM

## 2021-01-13 LAB
ALBUMIN SERPL-MCNC: 3.7 G/DL (ref 3.4–5)
ALBUMIN/GLOB SERPL: 1 {RATIO} (ref 1–2)
ALP LIVER SERPL-CCNC: 126 U/L
ALT SERPL-CCNC: 23 U/L
ANION GAP SERPL CALC-SCNC: 3 MMOL/L (ref 0–18)
AST SERPL-CCNC: 10 U/L (ref 15–37)
BASOPHILS # BLD AUTO: 0.06 X10(3) UL (ref 0–0.2)
BASOPHILS NFR BLD AUTO: 0.8 %
BILIRUB SERPL-MCNC: 0.7 MG/DL (ref 0.1–2)
BUN BLD-MCNC: 22 MG/DL (ref 7–18)
BUN/CREAT SERPL: 34.4 (ref 10–20)
CALCIUM BLD-MCNC: 9.4 MG/DL (ref 8.5–10.1)
CHLORIDE SERPL-SCNC: 107 MMOL/L (ref 98–112)
CHOLEST SMN-MCNC: 160 MG/DL (ref ?–200)
CK SERPL-CCNC: 35 U/L
CO2 SERPL-SCNC: 30 MMOL/L (ref 21–32)
CREAT BLD-MCNC: 0.64 MG/DL
DEPRECATED RDW RBC AUTO: 39.2 FL (ref 35.1–46.3)
EOSINOPHIL # BLD AUTO: 0.2 X10(3) UL (ref 0–0.7)
EOSINOPHIL NFR BLD AUTO: 2.5 %
ERYTHROCYTE [DISTWIDTH] IN BLOOD BY AUTOMATED COUNT: 13.1 % (ref 11–15)
EST. AVERAGE GLUCOSE BLD GHB EST-MCNC: 120 MG/DL (ref 68–126)
GLOBULIN PLAS-MCNC: 3.7 G/DL (ref 2.8–4.4)
GLUCOSE BLD-MCNC: 96 MG/DL (ref 70–99)
HBA1C MFR BLD HPLC: 5.8 % (ref ?–5.7)
HCT VFR BLD AUTO: 41.6 %
HDLC SERPL-MCNC: 66 MG/DL (ref 40–59)
HGB BLD-MCNC: 13.4 G/DL
IMM GRANULOCYTES # BLD AUTO: 0.02 X10(3) UL (ref 0–1)
IMM GRANULOCYTES NFR BLD: 0.3 %
LDLC SERPL CALC-MCNC: 81 MG/DL (ref ?–100)
LYMPHOCYTES # BLD AUTO: 1.8 X10(3) UL (ref 1–4)
LYMPHOCYTES NFR BLD AUTO: 22.7 %
M PROTEIN MFR SERPL ELPH: 7.4 G/DL (ref 6.4–8.2)
MCH RBC QN AUTO: 26.8 PG (ref 26–34)
MCHC RBC AUTO-ENTMCNC: 32.2 G/DL (ref 31–37)
MCV RBC AUTO: 83.2 FL
MONOCYTES # BLD AUTO: 0.69 X10(3) UL (ref 0.1–1)
MONOCYTES NFR BLD AUTO: 8.7 %
NEUTROPHILS # BLD AUTO: 5.17 X10 (3) UL (ref 1.5–7.7)
NEUTROPHILS # BLD AUTO: 5.17 X10(3) UL (ref 1.5–7.7)
NEUTROPHILS NFR BLD AUTO: 65 %
NONHDLC SERPL-MCNC: 94 MG/DL (ref ?–130)
OSMOLALITY SERPL CALC.SUM OF ELEC: 293 MOSM/KG (ref 275–295)
PATIENT FASTING Y/N/NP: YES
PATIENT FASTING Y/N/NP: YES
PLATELET # BLD AUTO: 256 10(3)UL (ref 150–450)
POTASSIUM SERPL-SCNC: 3.8 MMOL/L (ref 3.5–5.1)
RBC # BLD AUTO: 5 X10(6)UL
SODIUM SERPL-SCNC: 140 MMOL/L (ref 136–145)
TRIGL SERPL-MCNC: 67 MG/DL (ref 30–149)
VLDLC SERPL CALC-MCNC: 13 MG/DL (ref 0–30)
WBC # BLD AUTO: 7.9 X10(3) UL (ref 4–11)

## 2021-01-13 PROCEDURE — 82550 ASSAY OF CK (CPK): CPT

## 2021-01-13 PROCEDURE — 77063 BREAST TOMOSYNTHESIS BI: CPT | Performed by: INTERNAL MEDICINE

## 2021-01-13 PROCEDURE — 36415 COLL VENOUS BLD VENIPUNCTURE: CPT

## 2021-01-13 PROCEDURE — 85025 COMPLETE CBC W/AUTO DIFF WBC: CPT

## 2021-01-13 PROCEDURE — 80053 COMPREHEN METABOLIC PANEL: CPT

## 2021-01-13 PROCEDURE — 80061 LIPID PANEL: CPT

## 2021-01-13 PROCEDURE — 77067 SCR MAMMO BI INCL CAD: CPT | Performed by: INTERNAL MEDICINE

## 2021-01-13 PROCEDURE — 83036 HEMOGLOBIN GLYCOSYLATED A1C: CPT

## 2021-03-04 DIAGNOSIS — Z23 NEED FOR VACCINATION: ICD-10-CM

## 2021-03-25 ENCOUNTER — TELEPHONE (OUTPATIENT)
Dept: INTERNAL MEDICINE CLINIC | Facility: CLINIC | Age: 71
End: 2021-03-25

## 2021-03-25 NOTE — TELEPHONE ENCOUNTER
Pt's sister-Annika (on [place]) states the pt is visiting her new grand child in [place]. She is in the middle of dental work but will be home sometime in April. Serena states the pt is staying with her daughter who is concerned with her irrational thinking.  Serena jain

## 2021-03-25 NOTE — TELEPHONE ENCOUNTER
Spoke to sister, advised that patient should be evaluated locally by physician, symptoms she presenting similar to the symptoms 2 years ago when she was diagnosed with severe depression, anxiety, and early dementia.   Upon arrival she needs to be evaluated

## 2021-04-06 RX ORDER — ERGOCALCIFEROL 1.25 MG/1
50000 CAPSULE ORAL WEEKLY
Qty: 12 CAPSULE | Refills: 0 | Status: SHIPPED | OUTPATIENT
Start: 2021-04-06

## 2021-05-18 ENCOUNTER — TELEPHONE (OUTPATIENT)
Dept: GASTROENTEROLOGY | Facility: CLINIC | Age: 71
End: 2021-05-18

## 2021-05-18 NOTE — TELEPHONE ENCOUNTER
----- Message from Binta De Los Santos RN sent at 7/19/2016 11:56 AM CDT -----  Regarding: recall colon  Recall colon in 5 years per ES.  Colon done 6/17/16

## 2021-05-25 NOTE — TELEPHONE ENCOUNTER
Needs referral:    DR. Hal Diallo   PATIENT HAS APT  9/12  10:40 am     BLADDER INFECTIONS   ( 2 VISITS ) minimum assist (75% patients effort)

## 2021-06-04 ENCOUNTER — MA CHART PREP (OUTPATIENT)
Dept: FAMILY MEDICINE CLINIC | Facility: CLINIC | Age: 71
End: 2021-06-04

## 2021-06-04 PROBLEM — M85.80 OSTEOPENIA: Status: ACTIVE | Noted: 2021-06-04

## 2021-06-04 PROBLEM — I51.89 GRADE I DIASTOLIC DYSFUNCTION: Status: ACTIVE | Noted: 2021-06-04

## 2021-06-05 ENCOUNTER — OFFICE VISIT (OUTPATIENT)
Dept: INTERNAL MEDICINE CLINIC | Facility: CLINIC | Age: 71
End: 2021-06-05
Payer: MEDICARE

## 2021-06-05 ENCOUNTER — LAB ENCOUNTER (OUTPATIENT)
Dept: LAB | Age: 71
End: 2021-06-05
Attending: INTERNAL MEDICINE
Payer: MEDICARE

## 2021-06-05 VITALS
HEIGHT: 63 IN | HEART RATE: 66 BPM | DIASTOLIC BLOOD PRESSURE: 99 MMHG | WEIGHT: 201 LBS | RESPIRATION RATE: 18 BRPM | BODY MASS INDEX: 35.61 KG/M2 | SYSTOLIC BLOOD PRESSURE: 161 MMHG

## 2021-06-05 DIAGNOSIS — E78.49 OTHER HYPERLIPIDEMIA: ICD-10-CM

## 2021-06-05 DIAGNOSIS — I51.89 GRADE I DIASTOLIC DYSFUNCTION: ICD-10-CM

## 2021-06-05 DIAGNOSIS — E66.01 SEVERE OBESITY (BMI 35.0-39.9) WITH COMORBIDITY (HCC): ICD-10-CM

## 2021-06-05 DIAGNOSIS — N39.46 MIXED STRESS AND URGE URINARY INCONTINENCE: ICD-10-CM

## 2021-06-05 DIAGNOSIS — Z86.010 HISTORY OF COLON POLYPS: ICD-10-CM

## 2021-06-05 DIAGNOSIS — R73.03 PREDIABETES: ICD-10-CM

## 2021-06-05 DIAGNOSIS — I70.0 ATHEROSCLEROSIS OF AORTA (HCC): ICD-10-CM

## 2021-06-05 DIAGNOSIS — Z00.00 PHYSICAL EXAM, ANNUAL: Primary | ICD-10-CM

## 2021-06-05 DIAGNOSIS — Z85.42 HISTORY OF UTERINE CANCER: ICD-10-CM

## 2021-06-05 DIAGNOSIS — M25.561 CHRONIC PAIN OF BOTH KNEES: ICD-10-CM

## 2021-06-05 DIAGNOSIS — M25.562 CHRONIC PAIN OF BOTH KNEES: ICD-10-CM

## 2021-06-05 DIAGNOSIS — E55.9 VITAMIN D DEFICIENCY: ICD-10-CM

## 2021-06-05 DIAGNOSIS — I10 ESSENTIAL HYPERTENSION: ICD-10-CM

## 2021-06-05 DIAGNOSIS — G89.29 CHRONIC PAIN OF BOTH KNEES: ICD-10-CM

## 2021-06-05 DIAGNOSIS — F33.1 MODERATE EPISODE OF RECURRENT MAJOR DEPRESSIVE DISORDER (HCC): ICD-10-CM

## 2021-06-05 DIAGNOSIS — R41.3 MEMORY DEFICIT: ICD-10-CM

## 2021-06-05 DIAGNOSIS — Z91.81 AT RISK FOR FALLS: ICD-10-CM

## 2021-06-05 DIAGNOSIS — F43.12 CHRONIC POSTTRAUMATIC STRESS DISORDER: ICD-10-CM

## 2021-06-05 PROBLEM — B96.20 E COLI BACTEREMIA: Status: ACTIVE | Noted: 2021-06-05

## 2021-06-05 PROBLEM — R78.81 E COLI BACTEREMIA: Status: ACTIVE | Noted: 2021-06-05

## 2021-06-05 PROCEDURE — 80053 COMPREHEN METABOLIC PANEL: CPT

## 2021-06-05 PROCEDURE — G0439 PPPS, SUBSEQ VISIT: HCPCS | Performed by: INTERNAL MEDICINE

## 2021-06-05 PROCEDURE — 3077F SYST BP >= 140 MM HG: CPT | Performed by: INTERNAL MEDICINE

## 2021-06-05 PROCEDURE — 3080F DIAST BP >= 90 MM HG: CPT | Performed by: INTERNAL MEDICINE

## 2021-06-05 PROCEDURE — 80061 LIPID PANEL: CPT

## 2021-06-05 PROCEDURE — 36415 COLL VENOUS BLD VENIPUNCTURE: CPT

## 2021-06-05 PROCEDURE — 99397 PER PM REEVAL EST PAT 65+ YR: CPT | Performed by: INTERNAL MEDICINE

## 2021-06-05 PROCEDURE — 82306 VITAMIN D 25 HYDROXY: CPT

## 2021-06-05 PROCEDURE — 85025 COMPLETE CBC W/AUTO DIFF WBC: CPT

## 2021-06-05 PROCEDURE — 3008F BODY MASS INDEX DOCD: CPT | Performed by: INTERNAL MEDICINE

## 2021-06-05 PROCEDURE — 96160 PT-FOCUSED HLTH RISK ASSMT: CPT | Performed by: INTERNAL MEDICINE

## 2021-06-05 PROCEDURE — 83036 HEMOGLOBIN GLYCOSYLATED A1C: CPT

## 2021-06-05 RX ORDER — LOSARTAN POTASSIUM 100 MG/1
100 TABLET ORAL DAILY
Qty: 90 TABLET | Refills: 0 | Status: SHIPPED | OUTPATIENT
Start: 2021-06-05 | End: 2021-08-30

## 2021-06-05 RX ORDER — TRAZODONE HYDROCHLORIDE 50 MG/1
TABLET ORAL
Qty: 90 TABLET | Refills: 0 | Status: SHIPPED | OUTPATIENT
Start: 2021-06-05 | End: 2021-08-29

## 2021-06-05 RX ORDER — HYDROCODONE BITARTRATE AND ACETAMINOPHEN 5; 325 MG/1; MG/1
1 TABLET ORAL EVERY 8 HOURS PRN
Qty: 60 TABLET | Refills: 0 | Status: SHIPPED | OUTPATIENT
Start: 2021-06-05 | End: 2021-09-23

## 2021-06-05 NOTE — PROGRESS NOTES
HPI:   Talisha Forrester is a 72year old female who presents for a MA (Medicare Advantage) Supervisit (Once per calendar year). Patient presents to the office accompanied by her middle sister.   Main concern for the patient that she has severe pain in the urine?: 1-Yes  Do you have difficulty seeing?: 0-No  Do you have any difficulty walking or getting up?: 1-Yes  Do you have any tripping hazards?: 0-No  Are you on multiple medications?: 1-Yes  Does pain affect your day to day activities?: 1-Yes  Have you h things at home, or get along with other people?: Not difficult at all    Discussed meaning of living will, provided educational material to the patient she will submit copy of document when it is created   Advanced Directive:  She does NOT have a Living Wi Tab, Take 1 tablet by mouth every 8 (eight) hours as needed for Pain.  ergocalciferol 1.25 MG (23126 UT) Oral Cap, Take 1 capsule (50,000 Units total) by mouth once a week.   ATORVASTATIN 10 MG Oral Tab, TAKE 1 TABLET BY MOUTH EVERY DAY AT NIGHT  CARVEDILOL interaction and psychiatric symptoms  EXAM:   BP (!) 161/99   Pulse 66   Resp 18   Ht 5' 3\" (1.6 m)   Wt 201 lb (91.2 kg)   BMI 35.61 kg/m²  Estimated body mass index is 35.61 kg/m² as calculated from the following:    Height as of this encounter: 5' 3\" Normal appearance. HENT:      Head: Normocephalic and atraumatic. Eyes:      General: No scleral icterus. Extraocular Movements: Extraocular movements intact.       Conjunctiva/sclera: Conjunctivae normal.      Pupils: Pupils are equal, round, and r Diagnoses and all orders for this visit:    Physical exam, annual    Prediabetes check hemoglobin A1c current, continue low carbohydrate diet  -     HEMOGLOBIN A1C; Future    Essential hypertension uncontrolled, will discontinue enalapril start losartan Pain.         Diet assessment: fair     PLAN:  The patient indicates understanding of these issues and agrees to the plan. Reinforced healthy diet, lifestyle, and exercise.     Follow-up in 2 weeks    Katrina Ceja MD, 6/5/2021     [facility]     In  -     Colorectal Cancer Screening  Covered for ages 52-80; only need ONE of the following:    Colonoscopy   Covered every 10 years    Covered every 2 years if patient is at high risk or previous colonoscopy was abnormal 06/17/2016    Colonoscopy due on 06/ (ACE/ARB, digoxin diuretics, anticonvulsants)    Potassium Annually Lab Results   Component Value Date    K 3.8 01/13/2021         Creatinine   Annually Lab Results   Component Value Date    CREATSERUM 0.64 01/13/2021         BUN Annually Lab Results   Com

## 2021-06-17 RX ORDER — ATORVASTATIN CALCIUM 10 MG/1
TABLET, FILM COATED ORAL
Qty: 90 TABLET | Refills: 1 | Status: SHIPPED | OUTPATIENT
Start: 2021-06-17 | End: 2021-12-10

## 2021-06-17 RX ORDER — CARVEDILOL 12.5 MG/1
TABLET ORAL
Qty: 180 TABLET | Refills: 1 | Status: SHIPPED | OUTPATIENT
Start: 2021-06-17 | End: 2021-12-17

## 2021-06-19 ENCOUNTER — OFFICE VISIT (OUTPATIENT)
Dept: INTERNAL MEDICINE CLINIC | Facility: CLINIC | Age: 71
End: 2021-06-19
Payer: MEDICARE

## 2021-06-19 VITALS
HEART RATE: 69 BPM | HEIGHT: 63 IN | RESPIRATION RATE: 17 BRPM | BODY MASS INDEX: 34.99 KG/M2 | SYSTOLIC BLOOD PRESSURE: 130 MMHG | WEIGHT: 197.5 LBS | DIASTOLIC BLOOD PRESSURE: 86 MMHG

## 2021-06-19 DIAGNOSIS — I10 ESSENTIAL HYPERTENSION: Primary | ICD-10-CM

## 2021-06-19 DIAGNOSIS — G47.09 OTHER INSOMNIA: ICD-10-CM

## 2021-06-19 PROCEDURE — 3079F DIAST BP 80-89 MM HG: CPT | Performed by: INTERNAL MEDICINE

## 2021-06-19 PROCEDURE — 3008F BODY MASS INDEX DOCD: CPT | Performed by: INTERNAL MEDICINE

## 2021-06-19 PROCEDURE — 3075F SYST BP GE 130 - 139MM HG: CPT | Performed by: INTERNAL MEDICINE

## 2021-06-19 PROCEDURE — 99213 OFFICE O/P EST LOW 20 MIN: CPT | Performed by: INTERNAL MEDICINE

## 2021-06-19 NOTE — PROGRESS NOTES
HPI/Subjective:   Patient ID: Martina Mathews is a 72year old female. Presents for follow-up on hypertension, insomnia    HPI  Patient is accompanied by her sister.   2 weeks ago patient was advised to change medication and take losartan 100 mg daily carv Oral Tablet 24 Hr TAKE 1 TABLET BY MOUTH EVERY DAY 90 tablet 1   • Incontinence Supply Disposable (DISPOSABLE LINERS) Does not apply Misc Urinary  Bladder incontinance 100 each 3   • Elastic Bandages & Supports (MEDICAL COMPRESSION STOCKINGS) Does not appl

## 2021-06-24 RX ORDER — OXYBUTYNIN CHLORIDE 10 MG/1
TABLET, EXTENDED RELEASE ORAL
Qty: 90 TABLET | Refills: 1 | Status: SHIPPED | OUTPATIENT
Start: 2021-06-24 | End: 2021-12-14

## 2021-07-19 ENCOUNTER — TELEPHONE (OUTPATIENT)
Dept: INTERNAL MEDICINE CLINIC | Facility: CLINIC | Age: 71
End: 2021-07-19

## 2021-07-19 DIAGNOSIS — R41.3 MEMORY IMPAIRMENT OF GRADUAL ONSET: Primary | ICD-10-CM

## 2021-07-19 NOTE — TELEPHONE ENCOUNTER
Pts sister calling stating that she has tried to make an appointment for the pt to see a neurologist but insurance needs authorization from  in order to see a neurologist.   Sister requesting authorization as she also states that its important for pt to

## 2021-07-19 NOTE — TELEPHONE ENCOUNTER
Dr. Collin Rasheed, sister Steve Taylor is requesting the referral to Neurologist be changed to Dr. Noemi Tabares. Informed sister the specialist is out of network with [facility]. Sister is requesting a differed neurologist in-network with plan.      Sister is requesti

## 2021-07-20 NOTE — TELEPHONE ENCOUNTER
Patient's sister, Kristopher De La Cruz, is calling to request referral for neurologist Dr. Uriel Rhodes. Kristopher De La Cruz states patient has an appointment with Dr. Anca Lyles 08/25/2021.      Ph# [phone]

## 2021-07-20 NOTE — TELEPHONE ENCOUNTER
Spoke to sister Yonatan Lowry, informed of Dr. Nii Olea message. Eric Johnie, stated she didn't understand nurse Paola Wolf who seem confused on the entire message. Amira English to check [address] roster for [employer].      Yonatan Lowry will call back on

## 2021-07-20 NOTE — TELEPHONE ENCOUNTER
Please call sister only female neurologist I know Dr. Lucius Treviño who practices in [facility]. I heard her speaking on TV no problem to understand.   If you do have preference to see other neurologist they can provide us with the name and number as long as th

## 2021-07-20 NOTE — TELEPHONE ENCOUNTER
Sister (VIN) called , stated that she's been waiting from [place], informed that [facility] called and left a message earlier today (see note below ) , but stated that she did not received any call.   Informed Dr Mckay Goods note, BUT stated that Brittany  is

## 2021-08-25 ENCOUNTER — LAB ENCOUNTER (OUTPATIENT)
Dept: LAB | Facility: HOSPITAL | Age: 71
End: 2021-08-25
Attending: Other
Payer: MEDICARE

## 2021-08-25 ENCOUNTER — OFFICE VISIT (OUTPATIENT)
Dept: NEUROLOGY | Facility: CLINIC | Age: 71
End: 2021-08-25
Payer: MEDICARE

## 2021-08-25 ENCOUNTER — TELEPHONE (OUTPATIENT)
Dept: NEUROLOGY | Facility: CLINIC | Age: 71
End: 2021-08-25

## 2021-08-25 VITALS
SYSTOLIC BLOOD PRESSURE: 142 MMHG | HEIGHT: 64 IN | WEIGHT: 194 LBS | DIASTOLIC BLOOD PRESSURE: 80 MMHG | BODY MASS INDEX: 33.12 KG/M2

## 2021-08-25 DIAGNOSIS — F32.A DEPRESSION DETERMINED BY EXAMINATION: ICD-10-CM

## 2021-08-25 DIAGNOSIS — R41.3 MEMORY PROBLEM: ICD-10-CM

## 2021-08-25 DIAGNOSIS — R41.3 MEMORY PROBLEM: Primary | ICD-10-CM

## 2021-08-25 DIAGNOSIS — R29.810 FACIAL DROOP: ICD-10-CM

## 2021-08-25 DIAGNOSIS — I63.9 THALAMIC STROKE (HCC): ICD-10-CM

## 2021-08-25 LAB
TSI SER-ACNC: 1.37 MIU/ML (ref 0.36–3.74)
VIT B12 SERPL-MCNC: 407 PG/ML (ref 193–986)

## 2021-08-25 PROCEDURE — 3079F DIAST BP 80-89 MM HG: CPT | Performed by: OTHER

## 2021-08-25 PROCEDURE — 82607 VITAMIN B-12: CPT

## 2021-08-25 PROCEDURE — 99205 OFFICE O/P NEW HI 60 MIN: CPT | Performed by: OTHER

## 2021-08-25 PROCEDURE — 82525 ASSAY OF COPPER: CPT

## 2021-08-25 PROCEDURE — 84425 ASSAY OF VITAMIN B-1: CPT

## 2021-08-25 PROCEDURE — 3008F BODY MASS INDEX DOCD: CPT | Performed by: OTHER

## 2021-08-25 PROCEDURE — 36415 COLL VENOUS BLD VENIPUNCTURE: CPT

## 2021-08-25 PROCEDURE — 87536 HIV-1 QUANT&REVRSE TRNSCRPJ: CPT

## 2021-08-25 PROCEDURE — 84443 ASSAY THYROID STIM HORMONE: CPT

## 2021-08-25 PROCEDURE — 83921 ORGANIC ACID SINGLE QUANT: CPT

## 2021-08-25 PROCEDURE — 84207 ASSAY OF VITAMIN B-6: CPT

## 2021-08-25 PROCEDURE — 3077F SYST BP >= 140 MM HG: CPT | Performed by: OTHER

## 2021-08-25 NOTE — PROGRESS NOTES
[facility]  [address]  [phone]              Date: August 25, 2021  Patient Name: Aneudy Wick   MRN: [id number]    Reason for Evaluation: Memory Problem     HPI:     Aneudy Wick is a wanted the patient to be seen by a physician. The patient has had a severe psychosocial stressor (unable to see her grandchild) and now she is enraged at times, emotionally labile but no danger to herself. She is unable to manage her symptoms.      She i TABLET BY MOUTH TWICE A DAY, Disp: 180 tablet, Rfl: 1  lamoTRIgine (LAMICTAL) 25 MG Oral Tab, Take 50 mg by mouth daily.  Take 2 (25mg) tabs to equal 50mg   (Patient not taking: Reported on 8/25/2021 ), Disp: , Rfl:   BUPROPION HCL ER, XL, 300 MG Oral Table Mental Status:   Attention/Concentration: intact attention on bedside test   Fund of knowledge: intact  Speech: no dysarthria or aphasia     MOCA 21/30 deficits in visual spatial/executive (trail making, cube drawing), attention (reading list of digits 6/5/2021          12:27 PM 12:27 PM   WBC      4.0 - 11.0 x10(3) uL  6.5   RBC      3.80 - 5.30 x10(6)uL  4.96   Hemoglobin      12.0 - 16.0 g/dL  12.9   Hematocrit      35.0 - 48.0 %  41.6   MCV      80.0 - 100.0 fL  83.9   MCH      26.0 - 34.0 pg  26.0 - 149 mg/dL 63    LDL Cholesterol Calc      <100 mg/dL 94    VLDL      0 - 30 mg/dL 10    NON HDL CHOL      <130 mg/dL 106    HEMOGLOBIN A1c      <5.7 %  5.8 (H)   ESTIMATED AVERAGE GLUCOSE      68 - 126 mg/dL  120   Vitamin D, 25OH, Total      30.0 - 100. to help with depression and to assess for bipolar disorder  –Continue driving restrictions  –Future consideration: Aricept versus Namenda    Chronic left thalamic stroke may have corresponded to language difficulty, hence stroke rather than infarct.  May [employer] voice recognition dictation software and as a result, errors may occur. When identified, these errors have been corrected.  While every attempt is made to correct errors during dictation, discrepancies may still exist    S Caprice Claude, DO Staff [employer]

## 2021-08-25 NOTE — PATIENT INSTRUCTIONS
-Please obtain laboratory blood work   -We have ordered CT brain and CTA brain/carotids for investigation into your symptoms   -Neuropsychological testing   -Psychiatry referral provided   -Driving: continue not driving please   -Follow up in 3 months or s

## 2021-08-25 NOTE — TELEPHONE ENCOUNTER
[employer] for authorization of approval for CTA BRAIN + CTA CAROTIDS ([id number]) + CT BRAIN OR HEAD (52973). All approved with same Authorization # [id number] effective Aug 25 2021 - Sep 24 2021. L/m advising of approvals.  Proceed wit

## 2021-08-28 LAB
COPPER, SERUM: 144 UG/DL
MMA: 0.14 UMOL/L

## 2021-08-29 LAB
VITAMIN B1 (THIAMINE), WHOLE B: 77 NMOL/L
VITAMIN B6: 28.3 NMOL/L

## 2021-08-29 RX ORDER — TRAZODONE HYDROCHLORIDE 50 MG/1
TABLET ORAL
Qty: 90 TABLET | Refills: 1 | Status: SHIPPED | OUTPATIENT
Start: 2021-08-29

## 2021-08-29 NOTE — TELEPHONE ENCOUNTER
Refilled per [facility], [facility] protocol.   Requested Prescriptions   Pending Prescriptions Disp Refills    TRAZODONE 50 MG Oral Tab [Pharmacy Med Name: TRAZODONE 50 MG TABLET] 90 tablet 0     Sig: TAKE 1 TABLET BY MOUTH AT BEDTIME        Psychiatric Non-Sched

## 2021-08-30 RX ORDER — LOSARTAN POTASSIUM 100 MG/1
100 TABLET ORAL DAILY
Qty: 90 TABLET | Refills: 1 | Status: SHIPPED | OUTPATIENT
Start: 2021-08-30

## 2021-08-30 NOTE — TELEPHONE ENCOUNTER
Refill passed per [facility], [facility] protocol.     Requested Prescriptions   Pending Prescriptions Disp Refills    LOSARTAN 100 MG Oral Tab [Pharmacy Med Name: LOSARTAN POTASSIUM 100 MG TAB] 90 tablet 0     Sig: TAKE 1 TABLET BY MOUTH EVERY DAY        Yelitza

## 2021-09-01 ENCOUNTER — HOSPITAL ENCOUNTER (OUTPATIENT)
Dept: CT IMAGING | Facility: HOSPITAL | Age: 71
Discharge: HOME | End: 2021-09-01
Attending: Other
Payer: MEDICARE

## 2021-09-01 DIAGNOSIS — R41.3 MEMORY PROBLEM: ICD-10-CM

## 2021-09-01 LAB
CREAT BLD-MCNC: 0.8 MG/DL
HIV-1 QNT BY NAAT (COPIES/ML): NOT DETECTED CPY/ML
HIV-1 QNT BY NAAT (LOG COPIES/ML): NOT DETECTED LOG CPY/ML
HIV-1 QNT BY NAAT INTERP: NOT DETECTED

## 2021-09-01 PROCEDURE — 70496 CT ANGIOGRAPHY HEAD: CPT | Performed by: OTHER

## 2021-09-01 PROCEDURE — 70498 CT ANGIOGRAPHY NECK: CPT | Performed by: OTHER

## 2021-09-01 PROCEDURE — 82565 ASSAY OF CREATININE: CPT

## 2021-09-01 PROCEDURE — 70450 CT HEAD/BRAIN W/O DYE: CPT | Performed by: OTHER

## 2021-09-02 ENCOUNTER — PATIENT MESSAGE (OUTPATIENT)
Dept: NEUROLOGY | Facility: CLINIC | Age: 71
End: 2021-09-02

## 2021-09-02 ENCOUNTER — TELEPHONE (OUTPATIENT)
Dept: INTERNAL MEDICINE CLINIC | Facility: CLINIC | Age: 71
End: 2021-09-02

## 2021-09-02 DIAGNOSIS — E04.1 THYROID NODULE: Primary | ICD-10-CM

## 2021-09-02 DIAGNOSIS — I67.9 SMALL VESSEL DISEASE, CEREBROVASCULAR: Primary | ICD-10-CM

## 2021-09-02 RX ORDER — ASPIRIN 81 MG/1
81 TABLET, CHEWABLE ORAL DAILY
Qty: 30 TABLET | Refills: 0 | Status: SHIPPED | OUTPATIENT
Start: 2021-09-02 | End: 2021-09-27

## 2021-09-02 NOTE — TELEPHONE ENCOUNTER
From: Gianfranco Decker  To: Maty Cannon DO  Sent: 9/2/2021 11:26 AM CDT  Subject: Other    Thank you. We received the CT results. Please let me know who I can call for the neuropsychological testing to set up an appointment for my sister.      Thank

## 2021-09-02 NOTE — TELEPHONE ENCOUNTER
"The pharmacy did not name any medications as a substitute or step therapy. I suggested gabapentin because it was on the formulary and did not appear to require a PA. Sorry for the confusion. Insurance states the medication is excluded except for "the treatment of seizure disorders, post-herpetic neuralgia, diabetic peripheral neuropathy, fibromyalgia and neuropathic pain associated with spinal cord injury."  Spoke to patient and informed of PA denial from pharmacy.  She states that she may have used gabapentin after having lung surgery and rib cage reconstruction but does not remember if it worked or not. Says she is willing to try it if you think it will help.   " Spoke to patient, advised her that testing ordered by neurologist showed thyroid nodules, and my recommendation to do thyroid ultrasound, order placed, patient provided with the phone number to scheduling to set up appointment.

## 2021-09-09 ENCOUNTER — TELEPHONE (OUTPATIENT)
Dept: NEUROLOGY | Facility: CLINIC | Age: 71
End: 2021-09-09

## 2021-09-21 ENCOUNTER — TELEPHONE (OUTPATIENT)
Dept: INTERNAL MEDICINE CLINIC | Facility: CLINIC | Age: 71
End: 2021-09-21

## 2021-09-22 ENCOUNTER — HOSPITAL ENCOUNTER (OUTPATIENT)
Dept: ULTRASOUND IMAGING | Age: 71
Discharge: HOME | End: 2021-09-22
Attending: INTERNAL MEDICINE
Payer: MEDICARE

## 2021-09-22 DIAGNOSIS — E04.1 THYROID NODULE: ICD-10-CM

## 2021-09-22 PROCEDURE — 76536 US EXAM OF HEAD AND NECK: CPT | Performed by: INTERNAL MEDICINE

## 2021-09-23 RX ORDER — HYDROCODONE BITARTRATE AND ACETAMINOPHEN 5; 325 MG/1; MG/1
1 TABLET ORAL EVERY 8 HOURS PRN
Qty: 60 TABLET | Refills: 0 | Status: SHIPPED | OUTPATIENT
Start: 2021-09-23 | End: 2022-01-25

## 2021-09-23 NOTE — TELEPHONE ENCOUNTER
Protocol failed or has No Protocol, please review    Last fill = 6/5/2021  Requested Prescriptions   Pending Prescriptions Disp Refills    HYDROcodone-acetaminophen 5-325 MG Oral Tab 60 tablet 0     Sig: Take 1 tablet by mouth every 8 (eight) hours as need

## 2021-09-24 DIAGNOSIS — I63.9 THALAMIC STROKE (HCC): Primary | ICD-10-CM

## 2021-09-25 DIAGNOSIS — E04.1 THYROID NODULE: Primary | ICD-10-CM

## 2021-09-27 ENCOUNTER — TELEPHONE (OUTPATIENT)
Dept: INTERNAL MEDICINE CLINIC | Facility: CLINIC | Age: 71
End: 2021-09-27

## 2021-09-27 RX ORDER — ASPIRIN 81 MG
TABLET,CHEWABLE ORAL
Qty: 30 TABLET | Refills: 0 | Status: SHIPPED | OUTPATIENT
Start: 2021-09-27 | End: 2021-10-21

## 2021-09-27 NOTE — TELEPHONE ENCOUNTER
Received a call from Morris Kaur pt sister on VIN. She stated that pt is going to see Dr. Nisa Pritchard a Neurobehavioral referred by . The appt is on Oct 5.    Dr. Nisa Pritchard will like to get pt results of CT of the Brain CTA of brain and CTA of carotid to be f

## 2021-09-27 NOTE — TELEPHONE ENCOUNTER
Please advise patient or sister that she can stop aspirin 7 days prior to the procedure and no Motrin Advil Aleve only Tylenol for pain if needed during this time. And she can restart aspirin 72hours after procedure.   If she has no signs of bleeding in th

## 2021-09-27 NOTE — TELEPHONE ENCOUNTER
I received a call from pt sister Celina Castillo on VIN. She stated that pt is scheduled for the US FNA thyroid but they informed sister that pt needs to be off aspirin, motrin for 5 days prior to the procedure.  Daughter stated that pt does take a baby aspi

## 2021-09-27 NOTE — TELEPHONE ENCOUNTER
Informed sister of advice per Dr. Abram Brock. States understanding. Sister also wanting to inform Dr. Abram Brock patient has an appointment with Neuro Psych on 10/5 in which Dr. Arpit Cho referred her to.

## 2021-09-27 NOTE — TELEPHONE ENCOUNTER
Aspirin 81 MG Oral Chew Tab  Take one tablet by mouth daily.    #30, no refills     LOV: 8/25/21  NOV: 11/30/21  Last refilled: 9/2/21

## 2021-10-12 ENCOUNTER — HOSPITAL ENCOUNTER (OUTPATIENT)
Dept: ULTRASOUND IMAGING | Facility: HOSPITAL | Age: 71
Discharge: HOME | End: 2021-10-12
Attending: INTERNAL MEDICINE
Payer: MEDICARE

## 2021-10-12 VITALS
WEIGHT: 199 LBS | DIASTOLIC BLOOD PRESSURE: 90 MMHG | HEIGHT: 64 IN | HEART RATE: 59 BPM | SYSTOLIC BLOOD PRESSURE: 166 MMHG | BODY MASS INDEX: 33.97 KG/M2 | OXYGEN SATURATION: 96 %

## 2021-10-12 DIAGNOSIS — E04.1 THYROID NODULE: ICD-10-CM

## 2021-10-12 PROCEDURE — 10005 FNA BX W/US GDN 1ST LES: CPT | Performed by: INTERNAL MEDICINE

## 2021-10-12 PROCEDURE — 88177 CYTP FNA EVAL EA ADDL: CPT | Performed by: INTERNAL MEDICINE

## 2021-10-12 PROCEDURE — 88172 CYTP DX EVAL FNA 1ST EA SITE: CPT | Performed by: INTERNAL MEDICINE

## 2021-10-12 PROCEDURE — 88173 CYTOPATH EVAL FNA REPORT: CPT | Performed by: INTERNAL MEDICINE

## 2021-10-12 NOTE — IMAGING NOTE
1235: Stacy Saravia arrived in the ultrasound department. Identified with name and date of birth. Medications and allergies reviewed. NKDA reported.     1258: Scans taken by Erlin Cerda- ultrasound  sonographer     History:    INDICATIONS: Thyroid nodule     prepped and draped in a sterile manner. Pathology/cytology notified.       1315:  Lidocaine 1% 10 milligrams per ml  from kit  was given 4.0ml       FNA # 1 taken at 1317 with 25 g needle    FNA # 2 taken at 1318 with 25 g needle    Labeled specimens and a

## 2021-10-21 ENCOUNTER — TELEPHONE (OUTPATIENT)
Dept: INTERNAL MEDICINE CLINIC | Facility: CLINIC | Age: 71
End: 2021-10-21

## 2021-10-21 DIAGNOSIS — I63.9 THALAMIC STROKE (HCC): ICD-10-CM

## 2021-10-21 RX ORDER — ASPIRIN 81 MG
TABLET,CHEWABLE ORAL
Qty: 90 TABLET | Refills: 3 | Status: SHIPPED | OUTPATIENT
Start: 2021-10-21

## 2021-10-21 NOTE — TELEPHONE ENCOUNTER
Refill request for aspirin 81 mg, take 1 tab daily, #90, 3 refills    LOV: 8/25/21  NOV: 11/30/21  Last refilled on 9/27/21

## 2021-10-21 NOTE — TELEPHONE ENCOUNTER
Please follow-up with patient on discharge summary she should have orthopedic doctor's name probably where she was referred to. Speak to patient's sister who helps patient with appointment arrangements.   Speak to sister advise to call us [facility]

## 2021-10-21 NOTE — TELEPHONE ENCOUNTER
Patient states she was seen in an urgent care on [address] in [facility] this past Saturday due to left knee pain. States x-ray done which shows bone on bone, gave her a cortisone injection to left hip and prednisone tabs which she finished today.     Ani David

## 2021-10-25 NOTE — TELEPHONE ENCOUNTER
Patient was not sure which orthopedic is in-network.  [employer] message sent to patient with a list of in-.

## 2021-11-11 ENCOUNTER — TELEPHONE (OUTPATIENT)
Dept: INTERNAL MEDICINE CLINIC | Facility: CLINIC | Age: 71
End: 2021-11-11

## 2021-11-11 NOTE — TELEPHONE ENCOUNTER
Patient calling, identified name and ,        Patient it at DR. Weston office and they need the referral    Fax referral to [phone] as requested

## 2021-12-03 ENCOUNTER — TELEPHONE (OUTPATIENT)
Dept: INTERNAL MEDICINE CLINIC | Facility: CLINIC | Age: 71
End: 2021-12-03

## 2021-12-03 ENCOUNTER — TELEPHONE (OUTPATIENT)
Dept: CASE MANAGEMENT | Age: 71
End: 2021-12-03

## 2021-12-03 DIAGNOSIS — M25.562 LEFT KNEE PAIN, UNSPECIFIED CHRONICITY: Primary | ICD-10-CM

## 2021-12-03 NOTE — TELEPHONE ENCOUNTER
Dr. Barbara Albrecht called requesting referral for consult for knee surgery with Dr. Sloane Swanson. Pended referral please review diagnosis and sign off if you agree. Thank you.   Terrence Zamarripa

## 2021-12-03 NOTE — TELEPHONE ENCOUNTER
Called patient (confirmed name and )  Patient verified [place] [facility] and secondary [place]

## 2021-12-03 NOTE — TELEPHONE ENCOUNTER
Patient requested to see orthopedic specialist at [facility], I approved that, patient lives in [place] [facility] no driving.   Referral placed in external referrals please approve if possible let patient know, appointment scheduled for December 13, 2021

## 2021-12-07 ENCOUNTER — TELEPHONE (OUTPATIENT)
Dept: NEUROLOGY | Facility: CLINIC | Age: 71
End: 2021-12-07

## 2021-12-07 ENCOUNTER — LAB ENCOUNTER (OUTPATIENT)
Dept: LAB | Facility: HOSPITAL | Age: 71
End: 2021-12-07
Attending: Other
Payer: MEDICARE

## 2021-12-07 DIAGNOSIS — G31.84 MCI (MILD COGNITIVE IMPAIRMENT) WITH MEMORY LOSS: ICD-10-CM

## 2021-12-07 DIAGNOSIS — R79.89 LOW VITAMIN D LEVEL: ICD-10-CM

## 2021-12-07 PROCEDURE — 36415 COLL VENOUS BLD VENIPUNCTURE: CPT

## 2021-12-07 PROCEDURE — 80061 LIPID PANEL: CPT

## 2021-12-07 PROCEDURE — 82306 VITAMIN D 25 HYDROXY: CPT

## 2021-12-07 NOTE — TELEPHONE ENCOUNTER
Tried calling patient to let her know referral has been approve. Left message to call back  Per TE 12/3/21 Referral was faxed same day.

## 2021-12-07 NOTE — PROGRESS NOTES
[facility] [address]  [address]  [phone]          Established  Follow Up Visit       Date: December 7, 2021  Patient Name: Fady Chiu   MRN: [id number]  Primary physician: Seven Godinez nightly.  , Disp: , Rfl:   Incontinence Supply Disposable (DISPOSABLE LINERS) Does not apply Misc, Urinary  Bladder incontinance, Disp: 100 each, Rfl: 3    hyaluronic acid (SYNVISC-ONE) injection, 6 mL, Intra-articular, Once, Edie Raza MD woman with past medical history of migraine, thalamic infarct, TBI, ?dementia with anxiety and depression, hypertension, endometrial cancer in 2005 who presents for follow up regarding memory problems x 2 years, worsening in the setting of severe psychosoc <35\" for women, <40\" for men  *Diabetes - Target <6.5-7%   *Smoking - Target smoking cessation  *Hyperlipidemia - Target total cholesterol < 200, Target LDL <100, < 70 for high risk, Target HDL >45 for men, >55 for women, Target triglycerides <150  -Gabriela Moffett

## 2021-12-07 NOTE — TELEPHONE ENCOUNTER
CTA CAROTID ARTERIES (CPT=70498)-Defer till 3/22    Repeat on or around March 2022 per Dr. Milagros Westfall 12/07/21 notes.

## 2021-12-07 NOTE — PATIENT INSTRUCTIONS
Memantine HCl Oral Tablet 5 mg  Uses  For vascular dementia. Instructions  This medicine may be taken with or without food. It is very important that you take the medicine at about the same time every day. It will work best if you do this.   Keep the m speaking to your doctor or pharmacist.  Do not share this medicine with anyone who has not been prescribed this medicine. Side Effects  The following is a list of some common side effects from this medicine.  Please speak with your doctor about what you sh via [place]. Ebixt is meant for simple questions regarding medications, possible side effects, or other simple straight forward questions in limited sentences, rather than multiple paragraphs of discussion.   Ebixt is not meant for, or efficient fo

## 2021-12-07 NOTE — TELEPHONE ENCOUNTER
Referrals have been approved. Thank you, Shade Shah Specialist    [facility].

## 2021-12-10 RX ORDER — ATORVASTATIN CALCIUM 10 MG/1
TABLET, FILM COATED ORAL
Qty: 90 TABLET | Refills: 1 | Status: SHIPPED | OUTPATIENT
Start: 2021-12-10 | End: 2021-12-14

## 2021-12-14 DIAGNOSIS — Z86.73 CEREBRAL INFARCTION, CHRONIC: Primary | ICD-10-CM

## 2021-12-14 RX ORDER — ATORVASTATIN CALCIUM 40 MG/1
40 TABLET, FILM COATED ORAL NIGHTLY
Qty: 42 TABLET | Refills: 0 | Status: SHIPPED | OUTPATIENT
Start: 2021-12-14 | End: 2022-01-24

## 2021-12-14 RX ORDER — OXYBUTYNIN CHLORIDE 10 MG/1
10 TABLET, EXTENDED RELEASE ORAL DAILY
Qty: 90 TABLET | Refills: 1 | Status: SHIPPED | OUTPATIENT
Start: 2021-12-14

## 2021-12-14 NOTE — TELEPHONE ENCOUNTER
Refilled per [facility], [facility] protocol.   Requested Prescriptions   Pending Prescriptions Disp Refills    OXYBUTYNIN 10 MG Oral Tablet 24 Hr [Pharmacy Med Name: OXYBUTYNIN CL ER 10 MG TABLET] 90 tablet 1     Sig: TAKE 1 TABLET BY MOUTH EVERY DAY        Genit

## 2021-12-17 RX ORDER — CARVEDILOL 12.5 MG/1
TABLET ORAL
Qty: 180 TABLET | Refills: 1 | Status: SHIPPED | OUTPATIENT
Start: 2021-12-17

## 2022-01-22 DIAGNOSIS — Z86.73 CEREBRAL INFARCTION, CHRONIC: ICD-10-CM

## 2022-01-24 ENCOUNTER — TELEPHONE (OUTPATIENT)
Dept: INTERNAL MEDICINE CLINIC | Facility: CLINIC | Age: 72
End: 2022-01-24

## 2022-01-24 RX ORDER — ATORVASTATIN CALCIUM 40 MG/1
TABLET, FILM COATED ORAL
Qty: 90 TABLET | Refills: 0 | Status: SHIPPED | OUTPATIENT
Start: 2022-01-24

## 2022-01-24 NOTE — TELEPHONE ENCOUNTER
Pt is having left knee replacement on 2/24. Pt said because she has no car she wanted to know if Dr would see her on a saturday?  Please advise

## 2022-01-24 NOTE — TELEPHONE ENCOUNTER
1st attempt/left voice mail message for pt to call back. When the call is returned please see message below and help assist in scheduling an appointment.

## 2022-01-24 NOTE — TELEPHONE ENCOUNTER
Refill request for atorvastatin 40 mg, take 1 tab nightly, #90, no refills    LOV: 12/7/21  NOV: None  Last refilled on 12/14 for 42 tabs

## 2022-01-25 RX ORDER — HYDROCODONE BITARTRATE AND ACETAMINOPHEN 5; 325 MG/1; MG/1
1 TABLET ORAL EVERY 8 HOURS PRN
Qty: 60 TABLET | Refills: 0 | Status: SHIPPED | OUTPATIENT
Start: 2022-01-25

## 2022-01-25 NOTE — TELEPHONE ENCOUNTER
Please review Protocol Failed/No Protocol        Requested Prescriptions   Pending Prescriptions Disp Refills    HYDROcodone-acetaminophen 5-325 MG Oral Tab 60 tablet 0     Sig: Take 1 tablet by mouth every 8 (eight) hours as needed for Pain.         There

## 2022-01-30 DIAGNOSIS — G31.84 MCI (MILD COGNITIVE IMPAIRMENT) WITH MEMORY LOSS: ICD-10-CM

## 2022-01-31 RX ORDER — MEMANTINE HYDROCHLORIDE 10 MG/1
10 TABLET ORAL 2 TIMES DAILY
Qty: 180 TABLET | Refills: 0 | Status: SHIPPED | OUTPATIENT
Start: 2022-01-31

## 2022-01-31 NOTE — TELEPHONE ENCOUNTER
Refill request for memantine 10 mg, BID, #180, no refills    LOV: 12/7/21  NOV: None  Last refilled on 12/7/21 for 71 tabs

## 2022-02-04 ENCOUNTER — LAB ENCOUNTER (OUTPATIENT)
Dept: LAB | Age: 72
End: 2022-02-04
Attending: INTERNAL MEDICINE
Payer: MEDICARE

## 2022-02-04 ENCOUNTER — EKG ENCOUNTER (OUTPATIENT)
Dept: LAB | Age: 72
End: 2022-02-04
Attending: INTERNAL MEDICINE
Payer: MEDICARE

## 2022-02-04 ENCOUNTER — OFFICE VISIT (OUTPATIENT)
Dept: INTERNAL MEDICINE CLINIC | Facility: CLINIC | Age: 72
End: 2022-02-04
Payer: MEDICARE

## 2022-02-04 ENCOUNTER — HOSPITAL ENCOUNTER (OUTPATIENT)
Dept: GENERAL RADIOLOGY | Age: 72
Discharge: HOME | End: 2022-02-04
Attending: INTERNAL MEDICINE
Payer: MEDICARE

## 2022-02-04 VITALS
BODY MASS INDEX: 36.5 KG/M2 | SYSTOLIC BLOOD PRESSURE: 130 MMHG | HEIGHT: 63 IN | DIASTOLIC BLOOD PRESSURE: 74 MMHG | WEIGHT: 206 LBS | HEART RATE: 77 BPM | RESPIRATION RATE: 17 BRPM | OXYGEN SATURATION: 98 %

## 2022-02-04 DIAGNOSIS — Z01.818 PREOP EXAMINATION: ICD-10-CM

## 2022-02-04 DIAGNOSIS — I10 PRIMARY HYPERTENSION: ICD-10-CM

## 2022-02-04 DIAGNOSIS — Z01.818 PREOP EXAMINATION: Primary | ICD-10-CM

## 2022-02-04 DIAGNOSIS — E66.01 SEVERE OBESITY (BMI 35.0-39.9) WITH COMORBIDITY (HCC): ICD-10-CM

## 2022-02-04 DIAGNOSIS — M17.12 PRIMARY OSTEOARTHRITIS OF LEFT KNEE: ICD-10-CM

## 2022-02-04 DIAGNOSIS — N39.0 URINARY TRACT INFECTION WITHOUT HEMATURIA, SITE UNSPECIFIED: ICD-10-CM

## 2022-02-04 DIAGNOSIS — R58 ECCHYMOSIS: ICD-10-CM

## 2022-02-04 LAB
ALBUMIN/GLOB SERPL: 1.3 {RATIO} (ref 1–2)
ALP LIVER SERPL-CCNC: 143 U/L
ALT SERPL-CCNC: 24 U/L
ANION GAP SERPL CALC-SCNC: 2 MMOL/L (ref 0–18)
APTT PPP: 25.8 SECONDS (ref 23.3–35.6)
AST SERPL-CCNC: 14 U/L (ref 15–37)
BASOPHILS # BLD AUTO: 0.06 X10(3) UL (ref 0–0.2)
BASOPHILS NFR BLD AUTO: 0.9 %
BILIRUB SERPL-MCNC: 0.9 MG/DL (ref 0.1–2)
BILIRUB UR QL: NEGATIVE
BUN BLD-MCNC: 22 MG/DL (ref 7–18)
BUN/CREAT SERPL: 31.4 (ref 10–20)
CALCIUM BLD-MCNC: 9 MG/DL (ref 8.5–10.1)
CHLORIDE SERPL-SCNC: 107 MMOL/L (ref 98–112)
CO2 SERPL-SCNC: 30 MMOL/L (ref 21–32)
COLOR UR: YELLOW
CREAT BLD-MCNC: 0.7 MG/DL
DEPRECATED RDW RBC AUTO: 38.9 FL (ref 35.1–46.3)
EOSINOPHIL # BLD AUTO: 0.18 X10(3) UL (ref 0–0.7)
EOSINOPHIL NFR BLD AUTO: 2.7 %
ERYTHROCYTE [DISTWIDTH] IN BLOOD BY AUTOMATED COUNT: 12.9 % (ref 11–15)
FASTING STATUS PATIENT QL REPORTED: YES
GLOBULIN PLAS-MCNC: 3 G/DL (ref 2.8–4.4)
GLUCOSE BLD-MCNC: 96 MG/DL (ref 70–99)
GLUCOSE UR-MCNC: NEGATIVE MG/DL
HCT VFR BLD AUTO: 43.3 %
HGB BLD-MCNC: 13.6 G/DL
IMM GRANULOCYTES # BLD AUTO: 0.02 X10(3) UL (ref 0–1)
IMM GRANULOCYTES NFR BLD: 0.3 %
INR BLD: 0.93 (ref 0.8–1.2)
KETONES UR-MCNC: NEGATIVE MG/DL
LYMPHOCYTES # BLD AUTO: 1.54 X10(3) UL (ref 1–4)
LYMPHOCYTES NFR BLD AUTO: 22.8 %
MCH RBC QN AUTO: 26.2 PG (ref 26–34)
MCHC RBC AUTO-ENTMCNC: 31.4 G/DL (ref 31–37)
MCV RBC AUTO: 83.4 FL
MONOCYTES # BLD AUTO: 0.56 X10(3) UL (ref 0.1–1)
MONOCYTES NFR BLD AUTO: 8.3 %
MRSA NASAL: NEGATIVE
NEUTROPHILS # BLD AUTO: 4.39 X10 (3) UL (ref 1.5–7.7)
NEUTROPHILS # BLD AUTO: 4.39 X10(3) UL (ref 1.5–7.7)
NEUTROPHILS NFR BLD AUTO: 65 %
NITRITE UR QL STRIP.AUTO: NEGATIVE
OSMOLALITY SERPL CALC.SUM OF ELEC: 291 MOSM/KG (ref 275–295)
PH UR: 6 [PH] (ref 5–8)
PLATELET # BLD AUTO: 237 10(3)UL (ref 150–450)
POTASSIUM SERPL-SCNC: 4.2 MMOL/L (ref 3.5–5.1)
PROT SERPL-MCNC: 6.8 G/DL (ref 6.4–8.2)
PROT UR-MCNC: NEGATIVE MG/DL
PROTHROMBIN TIME: 12.6 SECONDS (ref 11.6–14.8)
RBC # BLD AUTO: 5.19 X10(6)UL
SODIUM SERPL-SCNC: 139 MMOL/L (ref 136–145)
SP GR UR STRIP: 1.02 (ref 1–1.03)
STAPH A BY PCR: NEGATIVE
UROBILINOGEN UR STRIP-ACNC: 4
WBC # BLD AUTO: 6.8 X10(3) UL (ref 4–11)

## 2022-02-04 PROCEDURE — 85730 THROMBOPLASTIN TIME PARTIAL: CPT

## 2022-02-04 PROCEDURE — 36415 COLL VENOUS BLD VENIPUNCTURE: CPT

## 2022-02-04 PROCEDURE — 85025 COMPLETE CBC W/AUTO DIFF WBC: CPT

## 2022-02-04 PROCEDURE — 87640 STAPH A DNA AMP PROBE: CPT

## 2022-02-04 PROCEDURE — 93005 ELECTROCARDIOGRAM TRACING: CPT

## 2022-02-04 PROCEDURE — 71046 X-RAY EXAM CHEST 2 VIEWS: CPT | Performed by: INTERNAL MEDICINE

## 2022-02-04 PROCEDURE — 93010 ELECTROCARDIOGRAM REPORT: CPT | Performed by: INTERNAL MEDICINE

## 2022-02-04 PROCEDURE — 80053 COMPREHEN METABOLIC PANEL: CPT

## 2022-02-04 PROCEDURE — 99214 OFFICE O/P EST MOD 30 MIN: CPT | Performed by: INTERNAL MEDICINE

## 2022-02-04 PROCEDURE — 81001 URINALYSIS AUTO W/SCOPE: CPT

## 2022-02-04 PROCEDURE — 85610 PROTHROMBIN TIME: CPT

## 2022-02-04 PROCEDURE — 87086 URINE CULTURE/COLONY COUNT: CPT

## 2022-02-04 PROCEDURE — 87641 MR-STAPH DNA AMP PROBE: CPT

## 2022-02-05 ENCOUNTER — TELEPHONE (OUTPATIENT)
Dept: INTERNAL MEDICINE CLINIC | Facility: CLINIC | Age: 72
End: 2022-02-05

## 2022-02-05 NOTE — TELEPHONE ENCOUNTER
Spoke to patient, advised on minimal abnormal EKG, advised that she should have stress test done, she will find out if she can do it at [facility] so it is closer for her, provided her with the scheduling number to schedule appointment at [facility] AND [facility]

## 2022-02-05 NOTE — TELEPHONE ENCOUNTER
lmtcb if patient calls back please ask for best number to call her back I need to speak to her regarding her test results

## 2022-02-06 NOTE — TELEPHONE ENCOUNTER
Stress test order faxed to [facility] per request.       From: Brando Mclaughlin MD  To: Ayad Burns  Sent: 2/5/2022 10:17 AM CST  Subject: Test results    [address]! Please check your EKG results and other test results feel free to give me a call, I would like you to have additional cardiac testing done order is in computer.

## 2022-02-08 ENCOUNTER — TELEPHONE (OUTPATIENT)
Dept: INTERNAL MEDICINE CLINIC | Facility: CLINIC | Age: 72
End: 2022-02-08

## 2022-02-08 NOTE — TELEPHONE ENCOUNTER
Spoke to [place], advised that patient had correct testing for MRSA and SA by PCR and she is negative, they should make arrangements for type and screen for the patient and call her, she is coming to [facility] on Thursday for stress testing.

## 2022-02-08 NOTE — TELEPHONE ENCOUNTER
Satish Matute from [facility] calling in regards to pre-op lab orders if Dr. Birtha Claude will be ordering a Staff PCR test which is different from the MRSA test.

## 2022-02-14 ENCOUNTER — TELEPHONE (OUTPATIENT)
Dept: INTERNAL MEDICINE CLINIC | Facility: CLINIC | Age: 72
End: 2022-02-14

## 2022-02-14 NOTE — TELEPHONE ENCOUNTER
Patient calling to see if stress test results were received from [facility] and if so is she cleared to have surgery? Please advise. 0

## 2022-02-15 ENCOUNTER — TELEPHONE (OUTPATIENT)
Dept: INTERNAL MEDICINE CLINIC | Facility: CLINIC | Age: 72
End: 2022-02-15

## 2022-02-15 NOTE — TELEPHONE ENCOUNTER
Ozzie from [facility] office would like like medical clearance faxed to [phone].  Please advise

## 2022-02-15 NOTE — TELEPHONE ENCOUNTER
Spoke to patient, advised that stress test was negative, I will clear her for surgery. Advised that after surgery she will should repeat urine test because of elevated RBCs in the urine and if it persist will need work-up for microscopic hematuria.   She agreed with the plan

## 2022-02-15 NOTE — TELEPHONE ENCOUNTER
Printed EKG, lab and chest xray results. Faxed signed office notes/clearance letter, ekg, lab and chest xray results to [facility] at [phone], and also to Dr. Mike Duggan. [address] [place] [address] office at [phone]. Rec'd both fax confirmations.

## 2022-02-15 NOTE — TELEPHONE ENCOUNTER
[address] calling to request EKG tracing for EKG on 5/23/2017. She states that if tracing is not available, report is okay.    Fax# [phone]

## 2022-02-15 NOTE — TELEPHONE ENCOUNTER
Please fax clearance and all necessary.   Blood work to surgical office and preop testing at [facility]

## 2022-02-25 ENCOUNTER — TELEPHONE (OUTPATIENT)
Dept: INTERNAL MEDICINE CLINIC | Facility: CLINIC | Age: 72
End: 2022-02-25

## 2022-02-25 NOTE — TELEPHONE ENCOUNTER
Patient is status post left knee replacement on 2/24/22 and being discharged home today. Was advised would need referral for PT services. Is scheduled to begin in home PT with providers from [facility] on 2/28/22. Once entered, please fax referral to number below. Per patient services are to be initiated on Monday 2/28/22. Was advised that referral needs to come from PCP. Physical Therapy @ [facility] number: [phone]    Order pended and routed to provider for consideration.

## 2022-02-25 NOTE — TELEPHONE ENCOUNTER
Faxed PT referral to [facility] at [phone]. Called pt. Left message, referral faxed,  Pt. Can call back with questions.

## 2022-02-25 NOTE — TELEPHONE ENCOUNTER
Please fax referral was requested, make sure that received request to notify patient that referral was faxed, advised to evening concerns with regards to the knee she should speak to Ortho office

## 2022-03-01 RX ORDER — TRAZODONE HYDROCHLORIDE 50 MG/1
TABLET ORAL
Qty: 90 TABLET | Refills: 1 | Status: SHIPPED | OUTPATIENT
Start: 2022-03-01 | End: 2022-08-29

## 2022-03-01 NOTE — TELEPHONE ENCOUNTER
Pt called back stated she have not received PT, Pt gave another fax Number different from 2/25/22, advised I will call [place] to Rep at [facility] she will have the surgeon team to call regarding this matter

## 2022-03-01 NOTE — TELEPHONE ENCOUNTER
Ana Garibay called back -nurse part of the team for Dr Adam Magana she will handle this matter for home health OP and let know PCP- do not have to send anything- fax number was incorrect

## 2022-03-01 NOTE — TELEPHONE ENCOUNTER
Refill passed per [facility] protocol.      Requested Prescriptions   Pending Prescriptions Disp Refills    traZODone 50 MG Oral Tab 90 tablet 1     Sig: TAKE 1 TABLET BY MOUTH AT BEDTIME        Psychiatric Non-Scheduled (Anti-Anxiety) Passed - 3/1/2022  1:47 PM        Passed - Appointment in last 6 or next 3 months                Recent Outpatient Visits              3 weeks ago Preop examination    [facility], Fritz Evangelista MD    Office Visit    2 months ago MCI (mild cognitive impairment) with memory loss    [facility], [address], [place]    Office Visit    6 months ago Memory problem    [facility], [address], [place]    Office Visit    8 months ago Essential hypertension    [address] [facility], [address] [place] [address], Elsie Guevara MD    Office Visit    8 months ago Physical exam, annual    [facility] [address] [facility] [address] [place] [address], Fritz Evangelista MD    Office Visit

## 2022-03-02 RX ORDER — LOSARTAN POTASSIUM 100 MG/1
100 TABLET ORAL DAILY
Qty: 90 TABLET | Refills: 1 | Status: SHIPPED | OUTPATIENT
Start: 2022-03-02 | End: 2022-08-29

## 2022-03-02 NOTE — TELEPHONE ENCOUNTER
Refill passed per [facility] protocol.   Requested Prescriptions   Pending Prescriptions Disp Refills    LOSARTAN 100 MG Oral Tab [Pharmacy Med Name: LOSARTAN POTASSIUM 100 MG TAB] 90 tablet 1     Sig: TAKE 1 TABLET BY MOUTH EVERY DAY        Hypertensive Medications Protocol Passed - 3/2/2022 12:30 AM        Passed - CMP or BMP in past 12 months        Passed - Appointment in past 6 or next 3 months        Passed - GFR Non- > 50     Lab Results   Component Value Date    GFRNAA 88 02/04/2022                      Recent Outpatient Visits              3 weeks ago [facility] examination    [facility], [address] [place], [place]    Office Visit    2 months ago MCI (mild cognitive impairment) with memory loss    [facility] [place]    Office Visit    6 months ago Memory problem    [facility] [address] [facility] [place]    Office Visit    8 months ago Essential hypertension    [facility], Eslie Abarca MD    Office Visit    9 months ago Physical exam, annual    [place] [facility] [place], [facility],  Ayanna Platt MD    Office Visit

## 2022-04-21 RX ORDER — ATORVASTATIN CALCIUM 10 MG/1
10 TABLET, FILM COATED ORAL NIGHTLY
Qty: 90 TABLET | Refills: 0 | Status: SHIPPED | OUTPATIENT
Start: 2022-04-21 | End: 2022-07-20

## 2022-04-21 RX ORDER — ATORVASTATIN CALCIUM 40 MG/1
TABLET, FILM COATED ORAL
Qty: 90 TABLET | Refills: 1 | Status: SHIPPED | OUTPATIENT
Start: 2022-04-21 | End: 2022-04-21

## 2022-04-21 NOTE — TELEPHONE ENCOUNTER
Refill request for atorvastatin 40 mg, take 1 tab nightly, #90, 1 refill    LOV: 12/7/21  NOV: None  Last refilled on 1/24/22

## 2022-05-05 RX ORDER — HYDROCODONE BITARTRATE AND ACETAMINOPHEN 5; 325 MG/1; MG/1
1 TABLET ORAL EVERY 8 HOURS PRN
Qty: 30 TABLET | Refills: 0 | Status: SHIPPED | OUTPATIENT
Start: 2022-05-05 | End: 2022-08-17

## 2022-05-05 RX ORDER — MEMANTINE HYDROCHLORIDE 10 MG/1
TABLET ORAL
Qty: 180 TABLET | Refills: 0 | Status: SHIPPED | OUTPATIENT
Start: 2022-05-05

## 2022-05-05 NOTE — TELEPHONE ENCOUNTER
Refill request for memantine 10 mg, BID, #180, no refills    LOV: 12/7/21  NOV: none  Last refilled on 1/31/22

## 2022-06-11 RX ORDER — OXYBUTYNIN CHLORIDE 10 MG/1
10 TABLET, EXTENDED RELEASE ORAL DAILY
Qty: 90 TABLET | Refills: 1 | Status: SHIPPED | OUTPATIENT
Start: 2022-06-11

## 2022-06-11 NOTE — TELEPHONE ENCOUNTER
Refill passed per [facility] protocol.     Requested Prescriptions   Pending Prescriptions Disp Refills    OXYBUTYNIN ER 10 MG Oral Tablet 24 Hr [Pharmacy Med Name: OXYBUTYNIN CL ER 10 MG TABLET] 90 tablet 1     Sig: TAKE 1 TABLET BY MOUTH EVERY DAY        Genitourinary Medications Passed - 6/11/2022  7:42 AM        Passed - Patient does not have pulmonary hypertension on problem list        Passed - Appointment in the past 12 or next 3 months            Recent Outpatient Visits              4 months ago [facility] [place]    [facility] Visit    6 months ago MCI (mild cognitive impairment) with memory loss    [address] [place], [place]    Office Visit    9 months ago Memory problem    [facility]    Office Visit    11 months ago Essential hypertension    [facility], [facility], Jigna Moreira [place], [place]    Office Visit    1 year ago [facility] exam, [facility]    [facility] Visit          Future Appointments         Provider Department Appt Notes    In 2 months Anup Flores MD [place] [employer] [employer] [facility], Saranya Moreira annual

## 2022-06-18 RX ORDER — CARVEDILOL 12.5 MG/1
TABLET ORAL
Qty: 180 TABLET | Refills: 1 | Status: SHIPPED | OUTPATIENT
Start: 2022-06-18

## 2022-07-19 RX ORDER — ATORVASTATIN CALCIUM 10 MG/1
TABLET, FILM COATED ORAL
Qty: 90 TABLET | Refills: 1 | Status: SHIPPED | OUTPATIENT
Start: 2022-07-19

## 2022-07-19 NOTE — TELEPHONE ENCOUNTER
Refill request for atorvastatin 10 mg, take 1 tab nightly, #90, 1 refill    LOV: 12/7/21  NOV: none  Last refilled on 4/21/22

## 2022-08-01 DIAGNOSIS — G31.84 MCI (MILD COGNITIVE IMPAIRMENT) WITH MEMORY LOSS: ICD-10-CM

## 2022-08-01 RX ORDER — MEMANTINE HYDROCHLORIDE 10 MG/1
TABLET ORAL
Qty: 180 TABLET | Refills: 0 | Status: SHIPPED | OUTPATIENT
Start: 2022-08-01

## 2022-08-01 NOTE — TELEPHONE ENCOUNTER
Refill request for memantine 10 mg, BID, #180, no refills    LOV: 12/7/21  NOV: None  Last refilled on 5/5/22

## 2022-08-18 RX ORDER — HYDROCODONE BITARTRATE AND ACETAMINOPHEN 5; 325 MG/1; MG/1
1 TABLET ORAL EVERY 8 HOURS PRN
Qty: 30 TABLET | Refills: 0 | Status: SHIPPED | OUTPATIENT
Start: 2022-08-18

## 2022-08-18 RX ORDER — HYDROCODONE BITARTRATE AND ACETAMINOPHEN 5; 325 MG/1; MG/1
1 TABLET ORAL EVERY 8 HOURS PRN
Qty: 30 TABLET | Refills: 0 | OUTPATIENT
Start: 2022-08-18

## 2022-08-29 RX ORDER — LOSARTAN POTASSIUM 100 MG/1
TABLET ORAL
Qty: 90 TABLET | Refills: 1 | Status: SHIPPED | OUTPATIENT
Start: 2022-08-29

## 2022-08-29 RX ORDER — TRAZODONE HYDROCHLORIDE 50 MG/1
TABLET ORAL
Qty: 90 TABLET | Refills: 1 | Status: SHIPPED | OUTPATIENT
Start: 2022-08-29

## 2022-08-30 NOTE — TELEPHONE ENCOUNTER
Please review; protocol failed/no protocol. Requested Prescriptions   Pending Prescriptions Disp Refills    TRAZODONE 50 MG Oral Tab [Pharmacy Med Name: TRAZODONE 50 MG TABLET] 90 tablet 1     Sig: TAKE 1 TABLET BY MOUTH EVERYDAY AT BEDTIME        There is no refill protocol information for this order        LOSARTAN 100 MG Oral Tab [Pharmacy Med Name: LOSARTAN POTASSIUM 100 MG TAB] 90 tablet 1     Sig: TAKE 1 TABLET BY MOUTH EVERY DAY        Hypertensive Medications Protocol Failed - 8/29/2022 12:36 AM        Failed - CMP or BMP in past 6 months     No results found for this or any previous visit (from the past 4392 hour(s)).               Failed - In person appointment or virtual visit in the past 6 months       Recent Outpatient Visits              6 months ago Preop examination    [facility], [address] [place], [place] [address]Alphonso [place], MD    Office Visit    8 months ago MCI (mild cognitive impairment) with memory loss    [address] [facility], [address], [place]    Office Visit    1 year ago Memory problem    [address] [facility], [address], [place]    Office Visit    1 year ago Essential hypertension    [facility],  [facility] Alphonso Cervantes [place], MD    Office Visit    1 year ago Physical exam, annual    [facility] [facility] [address] MD    Office Visit     Future Appointments         Provider Department Appt Notes    In 3 weeks Cameron Hair MD [address] [facility], [address], [place] medicare annual  policy informed               Passed - In person appointment in the past 12 or next 3 months       Recent Outpatient Visits              6 months ago Preop examination    [facility], Elsie Hawkins MD    Office Visit    8 months ago MCI (mild cognitive impairment) with memory loss    [facility] [facility], [address], [place]    Office Visit    1 year ago Memory problem    [address] [facility], [address], [place] Office Visit    1 year ago Essential hypertension    [facility], Main [address], Corinne Horseman, [place], [place]    Office Visit    1 year ago Physical exam, annual    Valerie Dey MD    Office Visit     Future Appointments         Provider Department Appt Notes    In 3 weeks Elo Zhou MD [facility], [address], [place] medicare annual  policy informed               Passed - Last BP reading less than 140/90     BP Readings from Last 1 Encounters:  02/04/22 : 130/74                Passed - GFR > 50     No results found for: [facility]                     Recent Outpatient Visits              6 months ago Preop examination    [address], Corinne Horseman, [place], [place]    Office Visit    8 months ago MCI (mild cognitive impairment) with memory loss    [address], [place]    Office Visit    1 year ago Memory problem    [address], [place]    Office Visit    1 year ago Essential hypertension    [address], Corinne Horseman, [place], [place]    Office Visit    1 year ago Physical exam, annual    [facility],  [address], Jenny Primrose, MD    Office Visit             Future Appointments         Provider Department Appt Notes    In 3 weeks Elo Zhou MD [employer],  Saranya Platt [facility] annual  policy informed

## 2022-09-23 ENCOUNTER — OFFICE VISIT (OUTPATIENT)
Dept: INTERNAL MEDICINE CLINIC | Facility: CLINIC | Age: 72
End: 2022-09-23

## 2022-09-23 ENCOUNTER — LAB ENCOUNTER (OUTPATIENT)
Dept: LAB | Age: 72
End: 2022-09-23
Attending: INTERNAL MEDICINE

## 2022-09-23 VITALS
HEIGHT: 63 IN | WEIGHT: 190 LBS | DIASTOLIC BLOOD PRESSURE: 89 MMHG | BODY MASS INDEX: 33.66 KG/M2 | SYSTOLIC BLOOD PRESSURE: 138 MMHG | OXYGEN SATURATION: 97 % | HEART RATE: 81 BPM

## 2022-09-23 DIAGNOSIS — I51.89 GRADE I DIASTOLIC DYSFUNCTION: ICD-10-CM

## 2022-09-23 DIAGNOSIS — E78.49 OTHER HYPERLIPIDEMIA: ICD-10-CM

## 2022-09-23 DIAGNOSIS — N39.46 MIXED STRESS AND URGE URINARY INCONTINENCE: ICD-10-CM

## 2022-09-23 DIAGNOSIS — Z00.00 MEDICARE ANNUAL WELLNESS VISIT, SUBSEQUENT: Primary | ICD-10-CM

## 2022-09-23 DIAGNOSIS — I10 PRIMARY HYPERTENSION: ICD-10-CM

## 2022-09-23 DIAGNOSIS — Z86.73 CEREBRAL INFARCTION, CHRONIC: ICD-10-CM

## 2022-09-23 DIAGNOSIS — Z91.81 AT RISK FOR FALLS: ICD-10-CM

## 2022-09-23 DIAGNOSIS — E66.9 OBESITY (BMI 30.0-34.9): ICD-10-CM

## 2022-09-23 DIAGNOSIS — Z96.652 STATUS POST TOTAL LEFT KNEE REPLACEMENT: ICD-10-CM

## 2022-09-23 DIAGNOSIS — I70.0 ATHEROSCLEROSIS OF AORTA (HCC): ICD-10-CM

## 2022-09-23 DIAGNOSIS — F43.12 CHRONIC POSTTRAUMATIC STRESS DISORDER: ICD-10-CM

## 2022-09-23 LAB
ALBUMIN SERPL-MCNC: 3.6 G/DL (ref 3.4–5)
ALBUMIN/GLOB SERPL: 1.1 {RATIO} (ref 1–2)
ALP LIVER SERPL-CCNC: 139 U/L
ALT SERPL-CCNC: 19 U/L
ANION GAP SERPL CALC-SCNC: 5 MMOL/L (ref 0–18)
AST SERPL-CCNC: 9 U/L (ref 15–37)
BASOPHILS # BLD AUTO: 0.04 X10(3) UL (ref 0–0.2)
BASOPHILS NFR BLD AUTO: 0.6 %
BILIRUB SERPL-MCNC: 0.4 MG/DL (ref 0.1–2)
BUN BLD-MCNC: 21 MG/DL (ref 7–18)
BUN/CREAT SERPL: 30 (ref 10–20)
CALCIUM BLD-MCNC: 8.7 MG/DL (ref 8.5–10.1)
CHLORIDE SERPL-SCNC: 111 MMOL/L (ref 98–112)
CHOLEST SERPL-MCNC: 176 MG/DL (ref ?–200)
CO2 SERPL-SCNC: 27 MMOL/L (ref 21–32)
CREAT BLD-MCNC: 0.7 MG/DL
DEPRECATED RDW RBC AUTO: 43.4 FL (ref 35.1–46.3)
EOSINOPHIL # BLD AUTO: 0.17 X10(3) UL (ref 0–0.7)
EOSINOPHIL NFR BLD AUTO: 2.6 %
ERYTHROCYTE [DISTWIDTH] IN BLOOD BY AUTOMATED COUNT: 14.3 % (ref 11–15)
FASTING PATIENT LIPID ANSWER: YES
FASTING STATUS PATIENT QL REPORTED: YES
GFR SERPLBLD BASED ON 1.73 SQ M-ARVRAT: 92 ML/MIN/1.73M2 (ref 60–?)
GLOBULIN PLAS-MCNC: 3.2 G/DL (ref 2.8–4.4)
GLUCOSE BLD-MCNC: 92 MG/DL (ref 70–99)
HCT VFR BLD AUTO: 42.7 %
HDLC SERPL-MCNC: 58 MG/DL (ref 40–59)
HGB BLD-MCNC: 13 G/DL
IMM GRANULOCYTES # BLD AUTO: 0.01 X10(3) UL (ref 0–1)
IMM GRANULOCYTES NFR BLD: 0.2 %
LDLC SERPL CALC-MCNC: 101 MG/DL (ref ?–100)
LYMPHOCYTES # BLD AUTO: 1.36 X10(3) UL (ref 1–4)
LYMPHOCYTES NFR BLD AUTO: 20.4 %
MCH RBC QN AUTO: 25.3 PG (ref 26–34)
MCHC RBC AUTO-ENTMCNC: 30.4 G/DL (ref 31–37)
MCV RBC AUTO: 83.2 FL
MONOCYTES # BLD AUTO: 0.62 X10(3) UL (ref 0.1–1)
MONOCYTES NFR BLD AUTO: 9.3 %
NEUTROPHILS # BLD AUTO: 4.46 X10 (3) UL (ref 1.5–7.7)
NEUTROPHILS # BLD AUTO: 4.46 X10(3) UL (ref 1.5–7.7)
NEUTROPHILS NFR BLD AUTO: 66.9 %
NONHDLC SERPL-MCNC: 118 MG/DL (ref ?–130)
OSMOLALITY SERPL CALC.SUM OF ELEC: 299 MOSM/KG (ref 275–295)
PLATELET # BLD AUTO: 261 10(3)UL (ref 150–450)
POTASSIUM SERPL-SCNC: 3.8 MMOL/L (ref 3.5–5.1)
PROT SERPL-MCNC: 6.8 G/DL (ref 6.4–8.2)
RBC # BLD AUTO: 5.13 X10(6)UL
SODIUM SERPL-SCNC: 143 MMOL/L (ref 136–145)
TRIGL SERPL-MCNC: 96 MG/DL (ref 30–149)
VLDLC SERPL CALC-MCNC: 16 MG/DL (ref 0–30)
WBC # BLD AUTO: 6.7 X10(3) UL (ref 4–11)

## 2022-09-23 PROCEDURE — G0439 PPPS, SUBSEQ VISIT: HCPCS | Performed by: INTERNAL MEDICINE

## 2022-09-23 PROCEDURE — 36415 COLL VENOUS BLD VENIPUNCTURE: CPT

## 2022-09-23 PROCEDURE — 85025 COMPLETE CBC W/AUTO DIFF WBC: CPT

## 2022-09-23 PROCEDURE — 1126F AMNT PAIN NOTED NONE PRSNT: CPT | Performed by: INTERNAL MEDICINE

## 2022-09-23 PROCEDURE — 80061 LIPID PANEL: CPT

## 2022-09-23 PROCEDURE — 80053 COMPREHEN METABOLIC PANEL: CPT

## 2022-10-06 ENCOUNTER — TELEPHONE (OUTPATIENT)
Dept: NEUROLOGY | Facility: CLINIC | Age: 72
End: 2022-10-06

## 2022-11-15 RX ORDER — HYDROCODONE BITARTRATE AND ACETAMINOPHEN 5; 325 MG/1; MG/1
1 TABLET ORAL EVERY 8 HOURS PRN
Qty: 30 TABLET | Refills: 0 | Status: SHIPPED | OUTPATIENT
Start: 2022-11-15
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
## 2022-12-05 DIAGNOSIS — G31.84 MCI (MILD COGNITIVE IMPAIRMENT) WITH MEMORY LOSS: ICD-10-CM

## 2022-12-05 DIAGNOSIS — Z86.73 HISTORY OF STROKE: ICD-10-CM

## 2022-12-05 DIAGNOSIS — Z86.73 CEREBRAL INFARCTION, CHRONIC: ICD-10-CM

## 2022-12-05 NOTE — TELEPHONE ENCOUNTER
Refill request for atorvastatin 20 mg, take 1 tab nightly, #30, no refills    LOV: 12/7/21  NOV: none  Last refilled on 11/4/22

## 2022-12-06 RX ORDER — ATORVASTATIN CALCIUM 20 MG/1
TABLET, FILM COATED ORAL
Qty: 30 TABLET | Refills: 0 | Status: SHIPPED | OUTPATIENT
Start: 2022-12-06

## 2022-12-13 RX ORDER — CARVEDILOL 12.5 MG/1
TABLET ORAL
Qty: 180 TABLET | Refills: 1 | Status: SHIPPED | OUTPATIENT
Start: 2022-12-13

## 2023-01-01 DIAGNOSIS — Z86.73 CEREBRAL INFARCTION, CHRONIC: ICD-10-CM

## 2023-01-01 DIAGNOSIS — G31.84 MCI (MILD COGNITIVE IMPAIRMENT) WITH MEMORY LOSS: ICD-10-CM

## 2023-01-01 DIAGNOSIS — Z86.73 HISTORY OF STROKE: ICD-10-CM

## 2023-01-03 RX ORDER — ATORVASTATIN CALCIUM 20 MG/1
TABLET, FILM COATED ORAL
Qty: 30 TABLET | Refills: 0 | OUTPATIENT
Start: 2023-01-03

## 2023-02-01 ENCOUNTER — PATIENT MESSAGE (OUTPATIENT)
Dept: FAMILY MEDICINE CLINIC | Facility: CLINIC | Age: 73
End: 2023-02-01

## 2023-02-15 ENCOUNTER — TELEPHONE (OUTPATIENT)
Dept: INTERNAL MEDICINE CLINIC | Facility: CLINIC | Age: 73
End: 2023-02-15

## 2023-02-23 RX ORDER — LOSARTAN POTASSIUM 100 MG/1
TABLET ORAL
Qty: 90 TABLET | Refills: 1 | Status: SHIPPED | OUTPATIENT
Start: 2023-02-23

## 2023-02-23 RX ORDER — TRAZODONE HYDROCHLORIDE 50 MG/1
TABLET ORAL
Qty: 90 TABLET | Refills: 1 | Status: SHIPPED | OUTPATIENT
Start: 2023-02-23

## 2023-02-23 NOTE — TELEPHONE ENCOUNTER
Refill passed per [facility] protocol.   Requested Prescriptions   Pending Prescriptions Disp Refills    LOSARTAN 100 MG Oral Tab [Pharmacy Med Name: LOSARTAN POTASSIUM 100 MG TAB] 90 tablet 1     Sig: TAKE 1 TABLET BY MOUTH EVERY DAY       Hypertensive Medications Protocol Passed - 2/23/2023 12:47 AM        Passed - In person appointment in the past 12 or next 3 months     Recent Outpatient Visits              5 months ago Medicare annual wellness visit, subsequent    [facility], Corrinne Smack, [place], [place]    Office Visit    1 year ago Preop examination    [address], [facility], Corrinne Smack, [place], [place]    Office Visit    1 year ago MCI (mild cognitive impairment) with memory loss    [address], [address], [place], [place]    Office Visit    1 year ago Memory problem    [address], [address], [place], [place]    Office Visit    1 year ago Essential hypertension    [facility], [facility], Corrinne Smack, [place], [place]    Office Visit                      Passed - Last BP reading less than 140/90     BP Readings from Last 1 Encounters:  09/23/22 : 138/89              Passed - CMP or BMP in past 6 months     Recent Results (from the past 4392 hour(s))   COMP METABOLIC PANEL (14)    Collection Time: 09/23/22  1:44 PM   Result Value Ref Range    Glucose 92 70 - 99 mg/dL    Sodium 143 136 - 145 mmol/L    Potassium 3.8 3.5 - 5.1 mmol/L    Chloride 111 98 - 112 mmol/L    CO2 27.0 21.0 - 32.0 mmol/L    Anion Gap 5 0 - 18 mmol/L    BUN 21 (H) 7 - 18 mg/dL    Creatinine 0.70 0.55 - 1.02 mg/dL    BUN/CREA Ratio 30.0 (H) 10.0 - 20.0    Calcium, Total 8.7 8.5 - 10.1 mg/dL    Calculated Osmolality 299 (H) 275 - 295 mOsm/kg    eGFR-Cr 92 >=60 mL/min/1.73m2    ALT 19 13 - 56 U/L    AST 9 (L) 15 - 37 U/L    Alkaline Phosphatase 139 55 - 142 U/L    Bilirubin, Total 0.4 0.1 - 2.0 mg/dL    Total Protein 6.8 6.4 - 8.2 g/dL    Albumin 3.6 3.4 - 5.0 g/dL    Globulin  3.2 2.8 - 4.4 g/dL    A/G Ratio 1.1 1.0 - 2.0    Patient Fasting for CMP? Yes      *Note: Due to a large number of results and/or encounters for the requested time period, some results have not been displayed. A complete set of results can be found in Results Review.                Passed - In person appointment or virtual visit in the past 6 months     Recent Outpatient Visits              5 months ago Medicare annual wellness visit, subsequent    Violetta Ribeiro [place], MD    Office Visit    1 year ago Preop examination    [facility], [place] Jeraldine Evans, MD    Office Visit    1 year ago MCI (mild cognitive impairment) with memory loss    [address], [place] [place], [place]    Office Visit    1 year ago Memory problem    [address], [address] [address] [place] Brandyn [place], [place]    Office Visit    1 year ago Essential hypertension    [address] Elsie Madrid MD    Office Visit                      Passed - EGFRCR or GFRNAA > 50     GFR Evaluation  EGFRCR: 92 , resulted on 9/23/2022            TRAZODONE 50 MG Oral Tab [Pharmacy Med Name: TRAZODONE 50 MG TABLET] 90 tablet 1     Sig: TAKE 1 TABLET BY MOUTH EVERYDAY AT BEDTIME       There is no refill protocol information for this order        Recent Outpatient Visits              5 months ago Medicare annual wellness visit, subsequent    Violetta Ribeiro [place], [place]    Office Visit    1 year ago Preop examination    [facility], [place] Jeraldine Evans, MD    Office Visit    1 year ago MCI (mild cognitive impairment) with memory loss    [address], [address], Brandyn [place], [place]    Office Visit    1 year ago Memory problem [address] [facility] [place], [place]    Office Visit    1 year ago Essential hypertension    [facility], Jenny Primrose, MD    Office Visit

## 2023-02-23 NOTE — TELEPHONE ENCOUNTER
Protocol failed or has No Protocol, please review    Last office visit = 9/23/22   Last refill = 8/29/22    Requested Prescriptions   Pending Prescriptions Disp Refills    TRAZODONE 50 MG Oral Tab [Pharmacy Med Name: TRAZODONE 50 MG TABLET] 90 tablet 1     Sig: TAKE 1 TABLET BY MOUTH EVERYDAY AT BEDTIME       There is no refill protocol information for this order      Signed Prescriptions Disp Refills    LOSARTAN 100 MG Oral Tab 90 tablet 1     Sig: TAKE 1 TABLET BY MOUTH EVERY DAY       Hypertensive Medications Protocol Passed - 2/23/2023 12:47 AM        Passed - In person appointment in the past 12 or next 3 months     Recent Outpatient Visits              5 months ago Medicare annual wellness visit, subsequent    [address] [facility], Elsie Norton MD    Office Visit    1 year ago Preop examination    [address] [facility], [place] Sherian Ruddy, MD    Office Visit    1 year ago MCI (mild cognitive impairment) with memory loss    [address], [address] [facility],[address], [place], [address] [facility], [place]    Office Visit    1 year ago Memory problem    [address], [address], [place], [address] [facility], [place]    Office Visit    1 year ago Essential hypertension    [address], [facility], Elsie Norton MD    Office Visit                      Passed - Last BP reading less than 140/90     BP Readings from Last 1 Encounters:  09/23/22 : 138/89              Passed - CMP or BMP in past 6 months     Recent Results (from the past 4392 hour(s))   COMP METABOLIC PANEL (14)    Collection Time: 09/23/22  1:44 PM   Result Value Ref Range    Glucose 92 70 - 99 mg/dL    Sodium 143 136 - 145 mmol/L    Potassium 3.8 3.5 - 5.1 mmol/L    Chloride 111 98 - 112 mmol/L    CO2 27.0 21.0 - 32.0 mmol/L    Anion Gap 5 0 - 18 mmol/L    BUN 21 (H) 7 - 18 mg/dL    Creatinine 0.70 0.55 - 1.02 mg/dL    BUN/CREA Ratio 30.0 (H) 10.0 - 20.0    Calcium, Total 8.7 8.5 - 10.1 mg/dL    Calculated Osmolality 299 (H) 275 - 295 mOsm/kg    eGFR-Cr 92 >=60 mL/min/1.73m2    ALT 19 13 - 56 U/L    AST 9 (L) 15 - 37 U/L    Alkaline Phosphatase 139 55 - 142 U/L    Bilirubin, Total 0.4 0.1 - 2.0 mg/dL    Total Protein 6.8 6.4 - 8.2 g/dL    Albumin 3.6 3.4 - 5.0 g/dL    Globulin  3.2 2.8 - 4.4 g/dL    A/G Ratio 1.1 1.0 - 2.0    Patient Fasting for CMP? Yes      *Note: Due to a large number of results and/or encounters for the requested time period, some results have not been displayed. A complete set of results can be found in Results Review.                Passed - In person appointment or virtual visit in the past 6 months     Recent Outpatient Visits              5 months ago Medicare annual wellness visit, subsequent    [facility], Corrinne Smack, [place], [place]    Office Visit    1 year ago Preop examination    [facility], [place] Diania Hipps, MD    Office Visit    1 year ago MCI (mild cognitive impairment) with memory loss    [employer], [address], [place], [place]    Office Visit    1 year ago Memory problem    [facility] [employer] [address], [place], [place]    Office Visit    1 year ago Essential hypertension    [facility], [place] Diania Hipps, MD    Office Visit                      Passed - EGFRCR or GFRNAA > 50     GFR Evaluation  EGFRCR: 92 , resulted on 9/23/2022               Recent Outpatient Visits              5 months ago Medicare annual wellness visit, subsequent    [facility], [place] Diania Hipps, MD    Office Visit    1 year ago Preop examination    [facility], [place] Diania Hipps, MD    Office Visit    1 year ago MCI (mild cognitive impairment) with memory loss    [employer], [address], [place], [place] Office Visit    1 year ago Memory problem    [address], [address], [place] Brandyn Hdz [place], [place]    Office Visit    1 year ago Essential hypertension    [address] [facility], Sasha Carrasquillo MD    Office Visit

## 2023-03-28 ENCOUNTER — TELEPHONE (OUTPATIENT)
Dept: INTERNAL MEDICINE CLINIC | Facility: CLINIC | Age: 73
End: 2023-03-28

## 2023-03-28 NOTE — TELEPHONE ENCOUNTER
Left message for patient to call back. Patient is due for annual medicare wellness visit with PCP for 2023.

## 2023-05-21 DIAGNOSIS — Z86.73 CEREBRAL INFARCTION, CHRONIC: ICD-10-CM

## 2023-05-21 DIAGNOSIS — Z86.73 HISTORY OF STROKE: ICD-10-CM

## 2023-05-21 DIAGNOSIS — G31.84 MCI (MILD COGNITIVE IMPAIRMENT) WITH MEMORY LOSS: ICD-10-CM

## 2023-05-22 RX ORDER — ATORVASTATIN CALCIUM 20 MG/1
TABLET, FILM COATED ORAL
Qty: 30 TABLET | Refills: 0 | Status: SHIPPED | OUTPATIENT
Start: 2023-05-22

## 2023-05-22 RX ORDER — CARVEDILOL 12.5 MG/1
TABLET ORAL
Qty: 180 TABLET | Refills: 1 | Status: SHIPPED | OUTPATIENT
Start: 2023-05-22

## 2023-05-22 NOTE — TELEPHONE ENCOUNTER
Please review. Protocol failed / No protocol. Requested Prescriptions   Pending Prescriptions Disp Refills    CARVEDILOL 12.5 MG Oral Tab [Pharmacy Med Name: CARVEDILOL 12.5 MG TABLET] 180 tablet 1     Sig: TAKE 1 TABLET BY MOUTH TWICE A DAY WITH MEALS       Hypertensive Medications Protocol Failed - 5/22/2023  2:16 PM        Failed - CMP or BMP in past 6 months     No results found for this or any previous visit (from the past 4392 hour(s)).               Failed - In person appointment or virtual visit in the past 6 months     Recent Outpatient Visits              8 months ago Medicare annual wellness visit, subsequent    [address] [facility], Elsie Urena MD    Office Visit    1 year ago Preop examination    [address] [facility], [place] Deana Hubert, MD    Office Visit    1 year ago MCI (mild cognitive impairment) with memory loss    [address], [address], [place], [place], [place]    Office Visit    1 year ago Memory problem    [address], [address], [place], [place], [place]    Office Visit    1 year ago Essential hypertension    [address] [facility], Uvaldo Sanders MD    Office Visit                      Passed - In person appointment in the past 12 or next 3 months     Recent Outpatient Visits              8 months ago Medicare annual wellness visit, subsequent    [address] [facility], Elsie Urena MD    Office Visit    1 year ago Preop examination    [address] [facility], [place] Deana Hubert, MD    Office Visit    1 year ago MCI (mild cognitive impairment) with memory loss    [address], [address], [place], [place], [place]    Office Visit    1 year ago Memory problem    [address], [address], [place], [place], [facility]    Office Visit    1 year ago Essential hypertension [address], [facility] MD Elsie    Office Visit                      Passed - Last BP reading less than 140/90     BP Readings from Last 1 Encounters:  09/23/22 : 138/89                Passed - EGFRCR or GFRNAA > 50     GFR Evaluation  EGFRCR: 92 , resulted on 9/23/2022                   Recent Outpatient Visits              8 months ago Medicare annual wellness visit, subsequent    [facility] [address], [place] Courtenay Drones, MD    Office Visit    1 year ago Preop examination    [address] [place] [address] Germ[place], [place] Courtenay Drones, MD    Office Visit    1 year ago MCI (mild cognitive impairment) with memory loss    [address], [address], [place], [place]    Office Visit    1 year ago Memory problem    [address], [address], [place], [place]    Office Visit    1 year ago Essential hypertension    [address] [facility], Dc Delacruz MD    Office Visit

## 2023-05-23 ENCOUNTER — TELEPHONE (OUTPATIENT)
Dept: INTERNAL MEDICINE CLINIC | Facility: CLINIC | Age: 73
End: 2023-05-23

## 2023-05-23 RX ORDER — LOSARTAN POTASSIUM 100 MG/1
100 TABLET ORAL DAILY
Qty: 90 TABLET | Refills: 1 | OUTPATIENT
Start: 2023-05-23

## 2023-05-23 NOTE — TELEPHONE ENCOUNTER
Duplicate request, previously addressed.       Too soon    LOSARTAN 100 MG Oral Tab 90 tablet 1 2/23/2023     Sig: TAKE 1 TABLET BY MOUTH EVERY DAY    Sent to pharmacy as: Losartan Potassium 100 MG Oral Tablet (Cozaar)    E-Prescribing Status: Receipt confirmed by pharmacy (2/23/2023 11:41 AM CST)

## 2023-06-09 DIAGNOSIS — Z86.73 HISTORY OF STROKE: ICD-10-CM

## 2023-06-09 DIAGNOSIS — G31.84 MCI (MILD COGNITIVE IMPAIRMENT) WITH MEMORY LOSS: ICD-10-CM

## 2023-06-09 DIAGNOSIS — Z86.73 CEREBRAL INFARCTION, CHRONIC: ICD-10-CM

## 2023-06-13 RX ORDER — ATORVASTATIN CALCIUM 20 MG/1
TABLET, FILM COATED ORAL
Qty: 30 TABLET | Refills: 0 | Status: SHIPPED | OUTPATIENT
Start: 2023-06-13

## 2023-06-16 RX ORDER — TRAZODONE HYDROCHLORIDE 50 MG/1
TABLET ORAL
Qty: 90 TABLET | Refills: 1 | Status: SHIPPED | OUTPATIENT
Start: 2023-06-16

## 2023-06-16 RX ORDER — LOSARTAN POTASSIUM 100 MG/1
TABLET ORAL
Qty: 90 TABLET | Refills: 1 | Status: SHIPPED | OUTPATIENT
Start: 2023-06-16

## 2023-06-16 NOTE — TELEPHONE ENCOUNTER
Please review. Protocol failed / No protocol. Requested Prescriptions   Pending Prescriptions Disp Refills    LOSARTAN 100 MG Oral Tab [Pharmacy Med Name: LOSARTAN POTASSIUM 100 MG TAB] 90 tablet 1     Sig: TAKE 1 TABLET BY MOUTH EVERY DAY       Hypertensive Medications Protocol Failed - 6/16/2023  3:49 AM        Failed - CMP or BMP in past 6 months     No results found for this or any previous visit (from the past 4392 hour(s)).             Failed - In person appointment or virtual visit in the past 6 months     Recent Outpatient Visits              8 months ago Medicare annual wellness visit, subsequent    [address] [facility], Elsie Astudillo MD    Office Visit    1 year ago Preop examination    [address] [facility], [place] Alisa Sieving, MD    Office Visit    1 year ago MCI (mild cognitive impairment) with memory loss    [facility], [address], Brand[place] Chop[place]    Office Visit    1 year ago Memory problem    [facility], [address], Brandyn Chop[place]    Office Visit    1 year ago Essential hypertension    [address] [facility], Masoud Pierre MD    Office Visit                      Passed - In person appointment in the past 12 or next 3 months     Recent Outpatient Visits              8 months ago Medicare annual wellness visit, subsequent    [address] [facility], Elsie Astudillo MD    Office Visit    1 year ago Preop examination    [address] [facility], [place] Alisa Sieving, MD    Office Visit    1 year ago MCI (mild cognitive impairment) with memory loss    [facility], [address], Brandyn Chop[place]    Office Visit    1 year ago Memory problem    [facility], [address], Brandyn Chopra [place]    Office Visit    1 year ago Essential hypertension [facility] Natalia Cervantes [place], MD    Office Visit                      Passed - Last BP reading less than 140/90     BP Readings from Last 1 Encounters:  09/23/22 : 138/89              Passed - EGFRCR or GFRNAA > 50     GFR Evaluation  EGFRCR: 92 , resulted on 9/23/2022            TRAZODONE 50 MG Oral Tab [Pharmacy Med Name: TRAZODONE 50 MG TABLET] 90 tablet 1     Sig: TAKE 1 TABLET BY MOUTH EVERYDAY AT BEDTIME       There is no refill protocol information for this order           Recent Outpatient Visits              8 months ago Medicare annual wellness visit, subsequent    [address] [place] Alcides Cervantes MD    Office Visit    1 year ago Preop examination    [address] [facility], [place] Vicenta Meeker, MD    Office Visit    1 year ago MCI (mild cognitive impairment) with memory loss    [facility] Alejandro, [address], [place]    Office Visit    1 year ago Memory problem    Linda Alejandro, [address], [place]op[place]    Office Visit    1 year ago Essential hypertension    [facility]    Office Visit

## 2023-06-22 ENCOUNTER — TELEPHONE (OUTPATIENT)
Dept: INTERNAL MEDICINE CLINIC | Facility: CLINIC | Age: 73
End: 2023-06-22

## 2023-06-22 NOTE — TELEPHONE ENCOUNTER
Left message to call back, due for annual medicare wellness visit for [phone] with PCP or coordinating NP.

## 2023-08-22 ENCOUNTER — TELEPHONE (OUTPATIENT)
Dept: INTERNAL MEDICINE CLINIC | Facility: CLINIC | Age: 73
End: 2023-08-22

## 2023-09-21 ENCOUNTER — TELEPHONE (OUTPATIENT)
Dept: INTERNAL MEDICINE CLINIC | Facility: CLINIC | Age: 73
End: 2023-09-21

## 2023-09-21 NOTE — TELEPHONE ENCOUNTER
Left message to call back, due for annual medicare wellness visit for [phone] with PCP or coordinating NP.

## 2023-10-05 ENCOUNTER — TELEPHONE (OUTPATIENT)
Dept: INTERNAL MEDICINE CLINIC | Facility: CLINIC | Age: 73
End: 2023-10-05

## 2023-10-05 NOTE — TELEPHONE ENCOUNTER
Patient is eligible for a 2023 Medicare Annual Wellness visit with PCP or NP. Pt lives in [place]. Address updated. Has new pcp, Jeovanny Daily. [facility] also [facility].

## 2023-12-18 RX ORDER — CARVEDILOL 12.5 MG/1
TABLET ORAL
Qty: 180 TABLET | Refills: 1 | OUTPATIENT
Start: 2023-12-18

## 2023-12-19 NOTE — TELEPHONE ENCOUNTER
LAST VISIT 9/23/2022 by DR Paula Chaudhry. No future appointments. Per chart review=looks like patient has a new PCP in [place]==[facility] ==Dr Castro . ====SUBSEQUENT ANNUAL WELLNESS VISIT (AWV)  10/2/23. See also 10/5/23  patient outreach encounter . ===patient lives in [place] now. REFILL REQUEST DENIED .

## 2024-02-07 RX ORDER — TRAZODONE HYDROCHLORIDE 50 MG/1
TABLET ORAL
Qty: 90 TABLET | Refills: 1 | OUTPATIENT
Start: 2024-02-07

## 2024-07-12 RX ORDER — TRAZODONE HYDROCHLORIDE 50 MG/1
TABLET ORAL
Qty: 90 TABLET | Refills: 1 | OUTPATIENT
Start: 2024-07-12

## 2024-07-12 NOTE — TELEPHONE ENCOUNTER
Please Review. Protocol Failed; No Protocol     Requested Prescriptions   Pending Prescriptions Disp Refills    TRAZODONE 50 MG Oral Tab [Pharmacy Med Name: TRAZODONE 50 MG TABLET] 90 tablet 1     Sig: TAKE 1 TABLET BY MOUTH EVERYDAY AT BEDTIME       There is no refill protocol information for this order              Recent Outpatient Visits              1 year ago Medicare annual wellness visit, subsequent    [facility], [place] K[place], N[place], MD    Office Visit    2 years ago Preop examination    [facility], [place] Kandel, Ninel, MD    Office Visit    2 years ago MCI (mild cognitive impairment) with memory loss    [facility]    [facility]    2 years ago [facility] problem    [facility]    [facility]    3 years ago Essential hypertension    [facility], [place] Kandel, Ninel, MD    Office Visit

## 2024-07-12 NOTE — TELEPHONE ENCOUNTER
Please call patient to speak to the family I cannot continue refilling trazodone she probably has a new primary care physician and neurologist who can refill this medication I have not seen urine in 2 years

## (undated) DIAGNOSIS — Z86.73 CEREBRAL INFARCTION, CHRONIC: ICD-10-CM

## (undated) DIAGNOSIS — G31.84 MCI (MILD COGNITIVE IMPAIRMENT) WITH MEMORY LOSS: ICD-10-CM

## (undated) NOTE — LETTER
5/18/2021    [address] [facility]            Dear Shantell Casey,      Our records indicate that you are due for an appointment for a Colonoscopy in June 2021, or shortly there after, with a physician at [facility]

## (undated) NOTE — LETTER
[facility]  [address],[facility] [address], [address]  [address]  [facility]: [phone]   Consent to Procedure/Sedation    Date: __2/4/2019_____    Time: ___12:31 PM ___    1.  I authorize the performance upon Gloria Webb foll Signature of person authorized to consent for patient: Relationship to patient:  ___________________________    ___________________    Juan Miguelsvetlana Joanne RN____________________  Date: __2/4/19 1 pm____________    Printed: 2/4/2019   12:31 PM    Patient

## (undated) NOTE — LETTER
8/8/2017              [address] [facility]         Blood pressure is under cood  Control she may proceed with dental work.       Sincerely,    Angel Mi MD  [address], [address], [place] [address]

## (undated) NOTE — LETTER
01/05/18        [address] [id number]      Dear Heidi Springer,    [facility] records indicate that you have outstanding lab work and or testing that was ordered for you and has not yet been completed:          CBC W Differential [address]

## (undated) NOTE — LETTER
August 19, 2017     [address]      Dear Meghan Johnson:     Your urine culture was negative, Pap smear is negative, I recommend  to see urologist for evaluation of UTI symptoms, you should try to push fluids for now, I THINPREP PAP SMEAR ONLY   Result Value Ref Range    Interpretation/Result Negative for intraepithelial lesion or malignancy      Specimen Adequacy       Satisfactory for evaluation. No endocervical or metaplastic cells seen.  Consistent with history of a hy

## (undated) NOTE — LETTER
August 14, 2017     [address]      Dear Lamar Anderson:    Below are the results from your recent visit:    urine culture was negative, Pap smear is negative, I recommend patient to see urologist for evaluation of UTI THINPREP PAP SMEAR ONLY   Result Value Ref Range    Interpretation/Result Negative for intraepithelial lesion or malignancy      Specimen Adequacy       Satisfactory for evaluation. No endocervical or metaplastic cells seen.  Consistent with history of a hy

## (undated) NOTE — LETTER
12/01/20        Cindy Meraz  [address]      Dear Angela Brown,    [facility] records indicate that you have outstanding lab work and or testing that was ordered for you and has not yet been completed:  Orders Placed This Encounter      C

## (undated) NOTE — MR AVS SNAPSHOT
[facility]  [address] [place] [address], [address] [place] [address] [place]               Thank you for choosing us for your health care visit with Harinder Meza MD.  We are glad to serve you and happy to provide you with this summary of yo 2. Tytne twhowie have 0 extension and 120 flexion on L knee for ease of stairs   3.  Patient will increase strength by 1 grade or better for stability of ambulation and stair management  4. t will be able to ambulate with least restrictive device safely on c

## (undated) NOTE — LETTER
[address] [place] [address]  Authorization for Invasive Procedures  1.  I hereby authorize  ___________________________ , my physician and whomever may be designated as the doctor's assistant, to perform the following operation and/or following are some, but not all, of the potential risks that can occur: fever and allergic reactions, hemolytic reactions, transmission of disease such as hepatitis, AIDS, cytomegalovirus (CMV), and flluid overload.  In the event that I wish to have autolog as may be necessary or desirable in the judgment of my physician.      Signature of Patient:  ________________________________________________ Date: _________Time: _________    Responsible person in case of minor or unconscious: ____________________________

## (undated) NOTE — MR AVS SNAPSHOT
[facility]  [address] [place] [address], [address] [place] [address]               Thank you for choosing us for your health care visit with Dave Bertrand MD.  We are glad to serve you and happy to provide you with this summary of yo [place], [address] (MRI Only)  [address]    It is the patient's responsibility to check with and follow their insurance company's guidelines for prior authorization for this test.  You may be he 145/87 mmHg 98 181 lb (82.101 kg)            Current Medications          This list is accurate as of: 5/23/17  2:02 PM.  Always use your most recent med list.                Albuterol Sulfate  (90 Base) MCG/ACT Aers   Inhale 2 puffs into the lungs [employer]     Call the [employer] for assistance with your inactive [employer] account    If you have questions, you can call [phone] to talk to our [facility] Staff. Remember, [employer] is NOT to be used for urgent needs.   For medical emergencies, d

## (undated) NOTE — LETTER
11/20/2017              [address] [facility]         Dear Aurelia Vásquez,      It was a pleasure to see you at our [facility] office.   Your urine cytology that was sent f

## (undated) NOTE — MR AVS SNAPSHOT
[facility]  [address] [place] [address], [address] [place] [address]               Thank you for choosing us for your health care visit with Doug Casillas MD.  We are glad to serve you and happy to provide you with this summary of yo Therapy Goals 10/3/2016  2:45 PM by Raymundo Smith, PT     Goal Comments    1 Patient will be independet with HEP its progression and management of symptoms. 2. Tytfransico villalobos have 0 extension and 120 flexion on L knee for ease of stairs   3.  Patient wi

## (undated) NOTE — Clinical Note
Good morning, I saw Yolette Ma today with mixed urinary incontinence. I am going to start with urodynamic testing to assess bladder function and work to manage sx pending those results. I appreciate the opportunity to participate in her care.  Thanks, Osbaldo Lund

## (undated) NOTE — LETTER
6/23/2020              [address] [facility]         . Patient may proceed with dental work. Blood pressure during office visit was 140/86.       Sincerely,    MD MAREN Gould [facility], [facility],

## (undated) NOTE — LETTER
9/30/2019              [address] [facility]         Dear Bonnie Maldonado,    This letter is to inform you that our office has made several attempts to reach you by phone without success.   We were attempting to contact y